# Patient Record
Sex: FEMALE | Race: WHITE | NOT HISPANIC OR LATINO | Employment: FULL TIME | URBAN - METROPOLITAN AREA
[De-identification: names, ages, dates, MRNs, and addresses within clinical notes are randomized per-mention and may not be internally consistent; named-entity substitution may affect disease eponyms.]

---
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
## 2022-03-24 LAB
C TRACH DNA SPEC QL NAA+PROBE: NORMAL
PAP RECOMMENDATION EXT: NORMAL
PAP SMEAR: NORMAL

## 2022-04-10 ENCOUNTER — HISTORICAL (OUTPATIENT)
Dept: ADMINISTRATIVE | Facility: HOSPITAL | Age: 22
End: 2022-04-10

## 2022-04-28 VITALS
BODY MASS INDEX: 31.83 KG/M2 | HEIGHT: 67 IN | WEIGHT: 202.81 LBS | OXYGEN SATURATION: 98 % | SYSTOLIC BLOOD PRESSURE: 101 MMHG | DIASTOLIC BLOOD PRESSURE: 71 MMHG

## 2022-09-17 ENCOUNTER — HISTORICAL (OUTPATIENT)
Dept: ADMINISTRATIVE | Facility: HOSPITAL | Age: 22
End: 2022-09-17

## 2023-03-16 ENCOUNTER — OFFICE VISIT (OUTPATIENT)
Dept: FAMILY MEDICINE | Facility: CLINIC | Age: 23
End: 2023-03-16
Payer: MEDICAID

## 2023-03-16 VITALS
TEMPERATURE: 99 F | BODY MASS INDEX: 31.83 KG/M2 | HEIGHT: 67 IN | OXYGEN SATURATION: 98 % | DIASTOLIC BLOOD PRESSURE: 80 MMHG | SYSTOLIC BLOOD PRESSURE: 114 MMHG | RESPIRATION RATE: 20 BRPM | HEART RATE: 80 BPM

## 2023-03-16 DIAGNOSIS — Z00.00 WELLNESS EXAMINATION: Primary | ICD-10-CM

## 2023-03-16 DIAGNOSIS — L30.9 ECZEMA, UNSPECIFIED TYPE: ICD-10-CM

## 2023-03-16 PROBLEM — E66.9 OBESITY: Status: ACTIVE | Noted: 2023-03-16

## 2023-03-16 PROCEDURE — 99214 OFFICE O/P EST MOD 30 MIN: CPT | Mod: PBBFAC | Performed by: FAMILY MEDICINE

## 2023-03-16 RX ORDER — NORETHINDRONE ACETATE AND ETHINYL ESTRADIOL 1; 20 MG/1; UG/1
1 TABLET ORAL DAILY
COMMUNITY
Start: 2023-03-01 | End: 2023-03-16 | Stop reason: SDUPTHER

## 2023-03-16 RX ORDER — NORETHINDRONE ACETATE AND ETHINYL ESTRADIOL 1; 20 MG/1; UG/1
1 TABLET ORAL DAILY
Qty: 90 TABLET | Refills: 3 | Status: SHIPPED | OUTPATIENT
Start: 2023-03-16 | End: 2024-03-28 | Stop reason: SDUPTHER

## 2023-03-16 RX ORDER — TRIAMCINOLONE ACETONIDE 1 MG/G
CREAM TOPICAL 2 TIMES DAILY
Qty: 45 G | Refills: 6 | Status: SHIPPED | OUTPATIENT
Start: 2023-03-16

## 2023-03-16 NOTE — PROGRESS NOTES
Subjective:       Patient ID: Beth Mccoy is a 23 y.o. female.    Chief Complaint: Follow-up (1 Year Annual Visit )    Follow-up  Pertinent negatives include no arthralgias, chest pain, headaches, joint swelling, neck pain, vomiting or weakness.     22 yo for follow up    No new concerns. Has been doing well. Working and in school.      Doing well on OCPs. No side effects. Menstrual cycles regular.     Eczema- uses steroid cream as needed.     Pap March 2022- NIL; chlamydia/gonorrhea negative  declines lab and tetanus vaccine today      Review of Systems   Constitutional:  Negative for activity change and unexpected weight change.   HENT:  Negative for hearing loss, rhinorrhea and trouble swallowing.    Eyes:  Negative for discharge and visual disturbance.   Respiratory:  Negative for chest tightness and wheezing.    Cardiovascular:  Negative for chest pain and palpitations.   Gastrointestinal:  Negative for blood in stool, constipation, diarrhea and vomiting.   Endocrine: Negative for polydipsia and polyuria.   Genitourinary:  Negative for difficulty urinating, dysuria, hematuria and menstrual problem.   Musculoskeletal:  Negative for arthralgias, joint swelling and neck pain.   Neurological:  Negative for weakness and headaches.   Psychiatric/Behavioral:  Negative for confusion and dysphoric mood.             Objective:      Vitals:    03/16/23 0853   BP: 114/80   Pulse: 80   Resp: 20   Temp: 98.7 °F (37.1 °C)       Physical Exam      Assessment/Plan:  Wellness examination  - OCP refilled  - healthy diet/exercise  - recommended daily sunscreen    Eczema, unspecified type  - moisturizer daily and topical steroid prn  - gentle skin care discussed    Other orders  -     JUNEL 1/20, 21, 1-20 mg-mcg per tablet; Take 1 tablet by mouth once daily.  Dispense: 90 tablet; Refill: 3  -     triamcinolone acetonide 0.1% (KENALOG) 0.1 % cream; Apply topically 2 (two) times daily.  Dispense: 45 g; Refill: 6    Declines any  labs and vaccines today     Follow up in about 1 year (around 3/16/2024).

## 2023-03-29 ENCOUNTER — DOCUMENTATION ONLY (OUTPATIENT)
Dept: FAMILY MEDICINE | Facility: CLINIC | Age: 23
End: 2023-03-29
Payer: MEDICAID

## 2023-07-21 ENCOUNTER — OFFICE VISIT (OUTPATIENT)
Dept: FAMILY MEDICINE | Facility: CLINIC | Age: 23
End: 2023-07-21
Payer: MEDICAID

## 2023-07-21 ENCOUNTER — HOSPITAL ENCOUNTER (OUTPATIENT)
Dept: RADIOLOGY | Facility: HOSPITAL | Age: 23
Discharge: HOME | End: 2023-07-21
Attending: FAMILY MEDICINE
Payer: MEDICAID

## 2023-07-21 VITALS
OXYGEN SATURATION: 99 % | RESPIRATION RATE: 18 BRPM | SYSTOLIC BLOOD PRESSURE: 115 MMHG | WEIGHT: 244 LBS | TEMPERATURE: 98 F | HEIGHT: 67 IN | DIASTOLIC BLOOD PRESSURE: 79 MMHG | BODY MASS INDEX: 38.3 KG/M2 | HEART RATE: 80 BPM

## 2023-07-21 DIAGNOSIS — M25.571 ACUTE RIGHT ANKLE PAIN: Primary | ICD-10-CM

## 2023-07-21 DIAGNOSIS — M25.571 ACUTE RIGHT ANKLE PAIN: ICD-10-CM

## 2023-07-21 PROCEDURE — 73610 X-RAY EXAM OF ANKLE: CPT | Mod: TC,RT

## 2023-07-21 PROCEDURE — 99214 OFFICE O/P EST MOD 30 MIN: CPT | Mod: PBBFAC | Performed by: FAMILY MEDICINE

## 2023-07-21 NOTE — PROGRESS NOTES
Subjective:       Patient ID: Beth Mccoy is a 23 y.o. female.    Chief Complaint: Ankle Pain (Asking for Ortho referral)    HPI    24 yo with right ankle pain. She hit her ankle on a boat propellor about 1 month ago. + bruised immediately. Still painful. Walking after sitting for longer period of time or when getting up in the AM causes increased pain. Located on medial aspect of ankle. + swelling at times. Has not done ice/compression/rest. No pain with ROM in ankle- only pain with walking. No imaging.     Review of Systems  As per HPI         Objective:      Vitals:    07/21/23 1051   BP: 115/79   Pulse: 80   Resp: 18   Temp: 98.4 °F (36.9 °C)       Physical Exam    Gen: alert, no acute distress  MSK: right ankle- minimal swelling on medial aspect. No bruising. Full ROM. Mild tenderness on medial aspect. Normal gait.   Psych: cooperative, appropriate mood and affect    Assessment/Plan:  Acute right ankle pain  -     X-Ray Ankle Complete Right; Future; Expected date: 07/21/2023    - xray, ice, compression, NSAID,   - return if symptoms continue     Follow up for keep previously scheduled follow up.

## 2023-07-21 NOTE — LETTER
July 21, 2023    Beth Mccoy  [address]  [facility] [address]             [facility]  Family Medicine  [facility] [address]  Phone: [phone]   July 21, 2023     Patient: Beth Mccoy   YOB: 2000   Date of Visit: 7/21/2023       To Whom it May Concern:    Beth Mccoy was seen in my clinic on 7/21/2023. She may return to work on 7/21/2023 .    Please excuse her from any classes or work missed.    If you have any questions or concerns, please don't hesitate to call.    Sincerely,         Laura Law MD

## 2024-03-28 ENCOUNTER — OFFICE VISIT (OUTPATIENT)
Dept: FAMILY MEDICINE | Facility: CLINIC | Age: 24
End: 2024-03-28
Payer: COMMERCIAL

## 2024-03-28 VITALS
WEIGHT: 247.13 LBS | TEMPERATURE: 98 F | RESPIRATION RATE: 18 BRPM | OXYGEN SATURATION: 99 % | BODY MASS INDEX: 38.79 KG/M2 | DIASTOLIC BLOOD PRESSURE: 83 MMHG | HEART RATE: 86 BPM | SYSTOLIC BLOOD PRESSURE: 128 MMHG | HEIGHT: 67 IN

## 2024-03-28 DIAGNOSIS — Z00.00 WELLNESS EXAMINATION: Primary | ICD-10-CM

## 2024-03-28 DIAGNOSIS — B36.0 TINEA VERSICOLOR: ICD-10-CM

## 2024-03-28 PROCEDURE — 99213 OFFICE O/P EST LOW 20 MIN: CPT | Mod: PBBFAC | Performed by: FAMILY MEDICINE

## 2024-03-28 RX ORDER — NORETHINDRONE ACETATE AND ETHINYL ESTRADIOL 1; 20 MG/1; UG/1
1 TABLET ORAL DAILY
Qty: 90 TABLET | Refills: 3 | Status: SHIPPED | OUTPATIENT
Start: 2024-03-28

## 2024-03-28 NOTE — PROGRESS NOTES
Subjective:       Patient ID: Beth Mccoy is a 24 y.o. female.    Chief Complaint: Annual Exam    HPI  23 yo for annual exam. Patient has been doing well. No recent illnesses    Has noticed a few lesions on her torso recently. Not itchy. Not located anywhere else.     Doing well on OCP. Menstrual cycles are regular.     Has graduated college and likes new job.    Pap March 2022 NIL    Review of Systems  As per HPI         Objective:      Vitals:    03/28/24 0800   BP: 128/83   Pulse: 86   Resp: 18   Temp: 98.1 °F (36.7 °C)       Physical Exam    General: pleasant, well developed, in no acute distress  Head: normocephalic, atraumatic  Skin: warm and dry; mildly hyperpigmented macules on torso  Heart: regular rate and rhythm, S1S2 normal, no murmurs, rubs, or gallops  Lungs: clear to auscultation bilaterally, no wheezes  Abdomen: bowel sounds present, soft, nontender  Extremities: no edema  Neurological: nonfocal, alert and oriented, cooperative with exam  Psych: judgement and insight good, though process logical, goal directed      Assessment/Plan:  Wellness examination  - doing well  - healthy lifestyle with diet/exercise    Tinea versicolor  - selsun blue as topical wash    Other orders  -     JUNEL 1/20, 21, 1-20 mg-mcg per tablet; Take 1 tablet by mouth once daily.  Dispense: 90 tablet; Refill: 3    Pap at next appointment       Follow up in about 1 year (around 3/28/2025).

## 2024-04-19 ENCOUNTER — OFFICE VISIT (OUTPATIENT)
Dept: FAMILY MEDICINE | Facility: CLINIC | Age: 24
End: 2024-04-19
Payer: COMMERCIAL

## 2024-04-19 VITALS
HEIGHT: 67 IN | TEMPERATURE: 98 F | SYSTOLIC BLOOD PRESSURE: 122 MMHG | OXYGEN SATURATION: 98 % | BODY MASS INDEX: 38.69 KG/M2 | WEIGHT: 246.5 LBS | RESPIRATION RATE: 18 BRPM | DIASTOLIC BLOOD PRESSURE: 81 MMHG | HEART RATE: 92 BPM

## 2024-04-19 DIAGNOSIS — Z30.9 ENCOUNTER FOR CONTRACEPTIVE MANAGEMENT, UNSPECIFIED TYPE: Primary | ICD-10-CM

## 2024-04-19 DIAGNOSIS — E66.9 OBESITY (BMI 35.0-39.9 WITHOUT COMORBIDITY): ICD-10-CM

## 2024-04-19 PROCEDURE — 99214 OFFICE O/P EST MOD 30 MIN: CPT | Mod: PBBFAC | Performed by: FAMILY MEDICINE

## 2024-04-19 NOTE — PROGRESS NOTES
Subjective:       Patient ID: Beth Mccoy is a 24 y.o. female.    Chief Complaint: Follow-up and Contraception (Wants to discuss options)    HPI  25 yo to discuss contraception options.   She has been on OCP x 6 years. She previously had intense cramping, heavy, long menstrual cycles. Since being on OCP, cycles are much better controlled. Pt notes that she has been on OCP x 6 years and has gained about 100 pounds over this time frame. Pt also reports decreased libido more recently and is worried that her OCPs are the cause of the weight gain and decreased libido.   Pt also interested in discussing options for weight loss medications. She has never taken any in the past. She reports snacking. Tries to exercise at times but not regularly.     Review of Systems  As per HPI         Objective:      Vitals:    04/19/24 0833   BP: 122/81   Pulse: 92   Resp: 18   Temp: 98.1 °F (36.7 °C)       Physical Exam    General: pleasant, well developed, in no acute distress  Head: normocephalic, atraumatic  Skin: warm and dry  Neurological: nonfocal, alert and oriented, cooperative with exam  Psych: judgement and insight good, though process logical, goal directed      Assessment/Plan:  Encounter for contraceptive management, unspecified type  - discussed various options for contraception with risks/benefits/alternatives  - patient given handout to continue to consider her options    Obesity (BMI 35.0-39.9 without comorbidity)  - diet/exercise/medications to assist with weight discussed  - risks/side effects/benefits of medications discussed  - patient handout given to continue to consider options       Follow up for keep previously scheduled appointment.

## 2024-12-12 ENCOUNTER — OFFICE VISIT (OUTPATIENT)
Dept: FAMILY MEDICINE | Facility: CLINIC | Age: 24
End: 2024-12-12
Payer: COMMERCIAL

## 2024-12-12 VITALS
WEIGHT: 255 LBS | RESPIRATION RATE: 20 BRPM | SYSTOLIC BLOOD PRESSURE: 128 MMHG | HEART RATE: 104 BPM | BODY MASS INDEX: 40.02 KG/M2 | HEIGHT: 67 IN | DIASTOLIC BLOOD PRESSURE: 77 MMHG | TEMPERATURE: 99 F | OXYGEN SATURATION: 99 %

## 2024-12-12 DIAGNOSIS — Z34.90 PREGNANCY, UNSPECIFIED GESTATIONAL AGE: Primary | ICD-10-CM

## 2024-12-12 LAB
B-HCG UR QL: POSITIVE
CTP QC/QA: YES

## 2024-12-12 PROCEDURE — 81025 URINE PREGNANCY TEST: CPT | Mod: PBBFAC | Performed by: FAMILY MEDICINE

## 2024-12-12 PROCEDURE — 99213 OFFICE O/P EST LOW 20 MIN: CPT | Mod: PBBFAC | Performed by: FAMILY MEDICINE

## 2024-12-12 RX ORDER — METHOCARBAMOL 500 MG/1
500 TABLET, FILM COATED ORAL 3 TIMES DAILY
COMMUNITY
Start: 2024-12-04 | End: 2024-12-12

## 2024-12-12 RX ORDER — DICLOFENAC SODIUM 75 MG/1
75 TABLET, DELAYED RELEASE ORAL 2 TIMES DAILY
COMMUNITY
Start: 2024-12-05 | End: 2024-12-12

## 2024-12-12 NOTE — PROGRESS NOTES
Subjective:       Patient ID: Beth Mccoy is a 24 y.o. female.    Chief Complaint: postive home pregnancy test    HPI  25 yo presents to clinic with a positive home UPT from today.  Patient's LMP was 11-12-24.  Patient stopped OCPs earlier this year.  Reports that menstrual cycles have been regular.  She has not been on prenatal vitamins.  This is the patient's 1st pregnancy.    She was recently seen in an urgent care for hip pain and was given an injection, NSAIDs, and muscle relaxer.  She is otherwise not on any medications at this time.    Patient denies any tobacco, alcohol, or drug use    Review of Systems  As per HPI         Objective:      Vitals:    12/12/24 0958   BP: 128/77   Pulse: 104   Resp: 20   Temp: 98.5 °F (36.9 °C)       Physical Exam    General: pleasant, well developed, in no acute distress  Head: normocephalic, atraumatic  Skin: warm and dry  Neurological: nonfocal, alert and oriented, cooperative with exam  Psych: judgement and insight good, though process logical, goal directed      Assessment/Plan:  Pregnancy, unspecified gestational age  -     POCT Urine Pregnancy    Other orders  -     PNV,calcium 72-iron-folic acid (PRENATAL VITAMIN PLUS LOW IRON) 27 mg iron- 1 mg Tab; Take 1 tablet (1 each total) by mouth once daily.  Dispense: 30 tablet; Refill: 11    UPT in clinic today positive  Start prenatal vitamins.  Safe medication in pregnancy list given to patient  Stop NSAIDs and muscle relaxers.  Discussed Tylenol use as needed for pain.  Discussed OB follow-up.  Patient will think about which OB clinic she would like to see and she will call to schedule an appointment.  Encouraged patient to call our office if she needs any assistance with establishing care and scheduling an appointment.  Avoid tobacco, alcohol, or drug use.     Follow up for keep previously scheduled appt.

## 2025-01-09 ENCOUNTER — NURSE TRIAGE (OUTPATIENT)
Dept: ADMINISTRATIVE | Facility: CLINIC | Age: 25
End: 2025-01-09
Payer: COMMERCIAL

## 2025-01-10 NOTE — TELEPHONE ENCOUNTER
Pt calling with saying that she is pregnant 8 weeks and that she started with a cough and fever that started this afternoon. Pt triaged and care advice to see MD within 24 hours. Pt will call back if any other questions or concerns or if SOB fever above 104 and if last over 3 days. I went over some thing to do at home and meds that are ok to take during pregnancy. I will route to provider she will be seeing on wed next week. I didn't see name of a provider. I couldn't route message                    Reason for Disposition   SEVERE coughing spells (e.g., whooping sound after coughing, vomiting after coughing)   Fever 100.4 F (38.0 C) or higher    Additional Information   Negative: SEVERE difficulty breathing (e.g., struggling for each breath, speaks in single words)   Negative: Bluish (or gray) lips or face now   Negative: [1] Rapid onset of cough AND [2] has hives   Negative: Coughing started suddenly after medicine, an allergic food or bee sting   Negative: [1] Difficulty breathing AND [2] exposure to flames, smoke, or fumes   Negative: [1] Stridor AND [2] difficulty breathing   Negative: Sounds like a life-threatening emergency to the triager   Negative: [1] MODERATE difficulty breathing (e.g., speaks in phrases, SOB even at rest, pulse 100-120) AND [2] still present when not coughing   Negative: Chest pain  (Exception: MILD central chest pain, present only when coughing.)   Negative: Patient sounds very sick or weak to the triager   Negative: [1] MILD difficulty breathing (e.g., minimal/no SOB at rest, SOB with walking, pulse <100) AND [2] still present when not coughing   Negative: [1] Coughed up blood AND [2] > 1 tablespoon (15 ml)   (Exception: Blood-tinged sputum.)   Negative: Fever > 103 F (39.4 C)   Negative: [1] Fever > 101 F (38.3 C) AND [2] age > 60 years   Negative: [1] Fever > 100 F (37.8 C) AND [2] bedridden (e.g., CVA, chronic illness, recovering from surgery)   Negative: [1] Fever > 100 F (37.8 C)  AND [2] diabetes mellitus or weak immune system (e.g., HIV positive, cancer chemo, splenectomy, organ transplant, chronic steroids)   Negative: Wheezing is present   Negative: [1] Ankle swelling AND [2] swelling is increasing   Negative: Shock suspected (e.g., cold/pale/clammy skin, too weak to stand, low BP, rapid pulse)   Negative: Difficult to awaken or acting confused (e.g., disoriented, slurred speech)   Negative: Rash with purple (or blood-colored) spots or dots   Negative: Sounds like a life-threatening emergency to the triager   Negative: [1] Headache AND [2] stiff neck (can't touch chin to chest)   Negative: Difficulty breathing   Negative: IV Drug Use (IVDU)   Negative: Fever > 104 F (40 C)   Negative: [1] Fever > 100 F (37.8 C) AND [2] indwelling urinary catheter (e.g., Russo)   Negative: [1] Fever > 100 F (37.8 C) AND [2] has port (portacath), central line, or PICC line   Negative: [1] Fever > 100 F (37.8 C) AND [2] diabetes mellitus or weak immune system (e.g., HIV positive, cancer chemo, splenectomy, organ transplant, chronic steroids)   Negative: Pain or burning with passing urine (urination) is main symptom, or increased frequency of urination   Negative: Severe chills (i.e., feeling extremely cold WITH shaking chills)   Negative: [1] Drinking very little AND [2] dehydration suspected (e.g., no urine > 12 hours, very dry mouth, very lightheaded)   Negative: Patient sounds very sick or weak to the triager   Negative: Having contractions or other symptoms of labor   Negative: Leakage of fluid from vagina   Negative: [1] Pregnant 23 or more weeks AND [2] baby is moving less today (e.g., kick count < 5 in 1 hour or < 10 in 2 hours)   Negative: [1] Fever > 100 F (37.8 C) AND [2] foreign travel to a developing country in the last month   Negative: [1] Fever 100.4 F (38.0 C) or higher AND [2] NO cold symptoms (e.g., runny nose, cough)   Negative: [1] Fever 100.4 F (38.0 C) or higher AND [2] lasts > 3  days    Protocols used: Cough - Acute Non-Productive-A-AH, Pregnancy - Fever-A-AH

## 2025-02-12 LAB
HIV 1+2 AB+HIV1 P24 AG SERPL QL IA: NEGATIVE
RPR: NONREACTIVE
RUBELLA IMMUNE STATUS: NORMAL

## 2025-04-03 ENCOUNTER — OFFICE VISIT (OUTPATIENT)
Dept: FAMILY MEDICINE | Facility: CLINIC | Age: 25
End: 2025-04-03
Payer: COMMERCIAL

## 2025-04-03 VITALS
WEIGHT: 245.38 LBS | BODY MASS INDEX: 38.51 KG/M2 | TEMPERATURE: 98 F | HEART RATE: 89 BPM | OXYGEN SATURATION: 98 % | DIASTOLIC BLOOD PRESSURE: 77 MMHG | HEIGHT: 67 IN | SYSTOLIC BLOOD PRESSURE: 114 MMHG

## 2025-04-03 DIAGNOSIS — Z34.90 PREGNANCY, UNSPECIFIED GESTATIONAL AGE: ICD-10-CM

## 2025-04-03 DIAGNOSIS — Z00.00 WELLNESS EXAMINATION: Primary | ICD-10-CM

## 2025-04-03 PROCEDURE — 99213 OFFICE O/P EST LOW 20 MIN: CPT | Mod: PBBFAC | Performed by: FAMILY MEDICINE

## 2025-04-03 NOTE — PROGRESS NOTES
Subjective:       Patient ID: Beth Mccoy is a 25 y.o. female.    Chief Complaint: Follow-up (Yearly visit, wellness check)    HPI  24 yo here for routine wellness    Currently pregnant- seeing Dr Rodriguez at [facility]. About 20 weeks pregnant. Taking PNV. Reports pregnancy has been going well. Feeling well. No complications in pregnancy reported by the patient so far. Female baby (Peg Rosenberg).  Had pap and labs this year with OB at the beginning of her pregnancy. No complaints or concerns at this time    Review of Systems  As per HPI         Objective:      Vitals:    04/03/25 0755   BP: 114/77   Pulse: 89   Temp: 98.3 °F (36.8 °C)       Physical Exam   General: pleasant, well developed, in no acute distress  Head: normocephalic, atraumatic  Skin: warm and dry  Heart: regular rate and rhythm, S1S2 normal, no murmurs, rubs, or gallops  Lungs: clear to auscultation bilaterally, no wheezes  Extremities: no edema  Neurological: nonfocal, alert and oriented, cooperative with exam  Psych: judgement and insight good, though process logical, goal directed       Assessment/Plan:  Wellness examination  - healthy lifestyle, exercise, healthy diet discussed  - pt will upload pap completed this year    Pregnancy, unspecified gestational age  - continue PNV and OB care       Follow up in about 1 year (around 4/3/2026).

## 2025-05-28 LAB — STREP B PCR (OHS): NEGATIVE

## 2025-06-09 ENCOUNTER — HOSPITAL ENCOUNTER (OUTPATIENT)
Facility: HOSPITAL | Age: 25
LOS: 1 days | Discharge: HOME | End: 2025-06-10
Attending: OBSTETRICS & GYNECOLOGY | Admitting: OBSTETRICS & GYNECOLOGY
Payer: COMMERCIAL

## 2025-06-09 DIAGNOSIS — O41.00X0 OLIGOHYDRAMNIOS, ANTEPARTUM, SINGLE OR UNSPECIFIED FETUS: Primary | ICD-10-CM

## 2025-06-09 DIAGNOSIS — Z34.90 PREGNANCY: ICD-10-CM

## 2025-06-09 DIAGNOSIS — O41.00X0 OLIGOHYDRAMNIOS: ICD-10-CM

## 2025-06-09 LAB
ABORH RETYPE: NORMAL
BASOPHILS # BLD AUTO: 0.04 X10(3)/MCL
BASOPHILS NFR BLD AUTO: 0.4 %
EOSINOPHIL # BLD AUTO: 0.06 X10(3)/MCL (ref 0–0.9)
EOSINOPHIL NFR BLD AUTO: 0.6 %
ERYTHROCYTE [DISTWIDTH] IN BLOOD BY AUTOMATED COUNT: 13.3 % (ref 11.5–17)
GROUP & RH: NORMAL
HCT VFR BLD AUTO: 38.8 % (ref 37–47)
HGB BLD-MCNC: 12.3 G/DL (ref 12–16)
IMM GRANULOCYTES # BLD AUTO: 0.06 X10(3)/MCL (ref 0–0.04)
IMM GRANULOCYTES NFR BLD AUTO: 0.6 %
INDIRECT COOMBS: NORMAL
LYMPHOCYTES # BLD AUTO: 2.28 X10(3)/MCL (ref 0.6–4.6)
LYMPHOCYTES NFR BLD AUTO: 22.8 %
MCH RBC QN AUTO: 29.7 PG (ref 27–31)
MCHC RBC AUTO-ENTMCNC: 31.7 G/DL (ref 33–36)
MCV RBC AUTO: 93.7 FL (ref 80–94)
MONOCYTES # BLD AUTO: 0.45 X10(3)/MCL (ref 0.1–1.3)
MONOCYTES NFR BLD AUTO: 4.5 %
NEUTROPHILS # BLD AUTO: 7.09 X10(3)/MCL (ref 2.1–9.2)
NEUTROPHILS NFR BLD AUTO: 71.1 %
NRBC BLD AUTO-RTO: 0 %
PLATELET # BLD AUTO: 294 X10(3)/MCL (ref 130–400)
PMV BLD AUTO: 12 FL (ref 7.4–10.4)
RBC # BLD AUTO: 4.14 X10(6)/MCL (ref 4.2–5.4)
SPECIMEN OUTDATE: NORMAL
WBC # BLD AUTO: 9.98 X10(3)/MCL (ref 4.5–11.5)

## 2025-06-09 PROCEDURE — 85025 COMPLETE CBC W/AUTO DIFF WBC: CPT | Performed by: OBSTETRICS & GYNECOLOGY

## 2025-06-09 PROCEDURE — G0378 HOSPITAL OBSERVATION PER HR: HCPCS

## 2025-06-09 PROCEDURE — 63600175 PHARM REV CODE 636 W HCPCS: Performed by: OBSTETRICS & GYNECOLOGY

## 2025-06-09 PROCEDURE — 36415 COLL VENOUS BLD VENIPUNCTURE: CPT | Performed by: OBSTETRICS & GYNECOLOGY

## 2025-06-09 PROCEDURE — 86901 BLOOD TYPING SEROLOGIC RH(D): CPT | Performed by: OBSTETRICS & GYNECOLOGY

## 2025-06-09 PROCEDURE — G0379 DIRECT REFER HOSPITAL OBSERV: HCPCS

## 2025-06-09 RX ORDER — SODIUM CHLORIDE, SODIUM LACTATE, POTASSIUM CHLORIDE, CALCIUM CHLORIDE 600; 310; 30; 20 MG/100ML; MG/100ML; MG/100ML; MG/100ML
INJECTION, SOLUTION INTRAVENOUS CONTINUOUS
Status: DISCONTINUED | OUTPATIENT
Start: 2025-06-09 | End: 2025-06-10 | Stop reason: HOSPADM

## 2025-06-09 RX ADMIN — SODIUM CHLORIDE, POTASSIUM CHLORIDE, SODIUM LACTATE AND CALCIUM CHLORIDE: 600; 310; 30; 20 INJECTION, SOLUTION INTRAVENOUS at 03:06

## 2025-06-10 VITALS
RESPIRATION RATE: 18 BRPM | SYSTOLIC BLOOD PRESSURE: 124 MMHG | OXYGEN SATURATION: 98 % | BODY MASS INDEX: 38.77 KG/M2 | DIASTOLIC BLOOD PRESSURE: 72 MMHG | WEIGHT: 247 LBS | HEIGHT: 67 IN | TEMPERATURE: 98 F | HEART RATE: 85 BPM

## 2025-06-10 PROBLEM — O26.643 INTRAHEPATIC CHOLESTASIS OF PREGNANCY IN THIRD TRIMESTER: Status: ACTIVE | Noted: 2025-06-10

## 2025-06-10 PROBLEM — O41.00X0 OLIGOHYDRAMNIOS ANTEPARTUM: Status: ACTIVE | Noted: 2025-06-10

## 2025-06-10 LAB
ALBUMIN SERPL-MCNC: 2.5 G/DL (ref 3.5–5)
ALBUMIN/GLOB SERPL: 0.7 RATIO (ref 1.1–2)
ALP SERPL-CCNC: 184 UNIT/L (ref 40–150)
ALT SERPL-CCNC: 172 UNIT/L (ref 0–55)
ANION GAP SERPL CALC-SCNC: 10 MEQ/L
AST SERPL-CCNC: 71 UNIT/L (ref 11–45)
BILIRUB SERPL-MCNC: 1.1 MG/DL
BUN SERPL-MCNC: 4.7 MG/DL (ref 7–18.7)
CALCIUM SERPL-MCNC: 8.6 MG/DL (ref 8.4–10.2)
CHLORIDE SERPL-SCNC: 108 MMOL/L (ref 98–107)
CO2 SERPL-SCNC: 22 MMOL/L (ref 22–29)
CREAT SERPL-MCNC: 0.57 MG/DL (ref 0.55–1.02)
CREAT/UREA NIT SERPL: 8
CTP QC/QA: YES
GFR SERPLBLD CREATININE-BSD FMLA CKD-EPI: >60 ML/MIN/1.73/M2
GLOBULIN SER-MCNC: 3.6 GM/DL (ref 2.4–3.5)
GLUCOSE SERPL-MCNC: 88 MG/DL (ref 74–100)
HAV IGM SERPL QL IA: NONREACTIVE
HBV CORE IGM SERPL QL IA: NONREACTIVE
HBV SURFACE AG SERPL QL IA: NONREACTIVE
HCV AB SERPL QL IA: NONREACTIVE
POTASSIUM SERPL-SCNC: 3.5 MMOL/L (ref 3.5–5.1)
PROT SERPL-MCNC: 6.1 GM/DL (ref 6.4–8.3)
RUPTURE OF MEMBRANE: NEGATIVE
SODIUM SERPL-SCNC: 140 MMOL/L (ref 136–145)

## 2025-06-10 PROCEDURE — 63600175 PHARM REV CODE 636 W HCPCS: Performed by: OBSTETRICS & GYNECOLOGY

## 2025-06-10 PROCEDURE — 80053 COMPREHEN METABOLIC PANEL: CPT | Performed by: OBSTETRICS & GYNECOLOGY

## 2025-06-10 PROCEDURE — 82542 COL CHROMOTOGRAPHY QUAL/QUAN: CPT | Performed by: OBSTETRICS & GYNECOLOGY

## 2025-06-10 PROCEDURE — G0378 HOSPITAL OBSERVATION PER HR: HCPCS

## 2025-06-10 PROCEDURE — 80074 ACUTE HEPATITIS PANEL: CPT | Performed by: OBSTETRICS & GYNECOLOGY

## 2025-06-10 PROCEDURE — 36415 COLL VENOUS BLD VENIPUNCTURE: CPT | Performed by: OBSTETRICS & GYNECOLOGY

## 2025-06-10 RX ORDER — URSODIOL 250 MG/1
500 TABLET, FILM COATED ORAL
Qty: 180 TABLET | Refills: 3 | Status: SHIPPED | OUTPATIENT
Start: 2025-06-10 | End: 2025-10-08

## 2025-06-10 RX ORDER — URSODIOL 250 MG/1
500 TABLET, FILM COATED ORAL
Status: DISCONTINUED | OUTPATIENT
Start: 2025-06-10 | End: 2025-06-10 | Stop reason: HOSPADM

## 2025-06-10 RX ORDER — URSODIOL 250 MG/1
250 TABLET, FILM COATED ORAL 2 TIMES DAILY WITH MEALS
Status: DISCONTINUED | OUTPATIENT
Start: 2025-06-10 | End: 2025-06-10

## 2025-06-10 RX ORDER — SODIUM CHLORIDE, SODIUM LACTATE, POTASSIUM CHLORIDE, CALCIUM CHLORIDE 600; 310; 30; 20 MG/100ML; MG/100ML; MG/100ML; MG/100ML
INJECTION, SOLUTION INTRAVENOUS CONTINUOUS
Status: DISCONTINUED | OUTPATIENT
Start: 2025-06-10 | End: 2025-06-10

## 2025-06-10 RX ADMIN — SODIUM CHLORIDE, POTASSIUM CHLORIDE, SODIUM LACTATE AND CALCIUM CHLORIDE: 600; 310; 30; 20 INJECTION, SOLUTION INTRAVENOUS at 11:06

## 2025-06-10 RX ADMIN — SODIUM CHLORIDE, POTASSIUM CHLORIDE, SODIUM LACTATE AND CALCIUM CHLORIDE 1000 ML: 600; 310; 30; 20 INJECTION, SOLUTION INTRAVENOUS at 10:06

## 2025-06-10 NOTE — DISCHARGE SUMMARY
Delivery Discharge Summary  Obstetrics      Primary OB Clinician: MD Jennifer    Discharge Provider: Jevon Rodriguez MD    Admission date: 2025  Discharge date: 06/10/2025    Admit Dx:   Discharge Dx:  Problem List[1]  Oligohydramnios  ICP  Transaminitis    Hospital Course:  Beth Mccoy is a 25 y.o.  at 30+0 weeks gestation who was admitted on 2025 for observation in the setting of new oligohydramnios. US on  in clinic showed SHEFALI 4.37 cm without 2x2 pocket. She received IVF overnight, however repeat US showed SHEFALI 3. ROM+ was negative. She received more aggressive IVF and SHEFALI was still low at 4, however there was a 2x2 pocket present. She complained of itching of palms and soles and LFTs were noted to be elevated. She was started on Actigall, bile acids pending at discharge. She was evaluated by MFM and deemed safe for discharge with close follow up.     Pertinent studies:  Postpartum CBC  Lab Results   Component Value Date    WBC 9.98 2025    HGB 12.3 2025    HCT 38.8 2025    MCV 93.7 2025     2025       This patient has no babies on file.    Disposition: To home, self care    Follow Up: Later this week with MFM. Twice weekly testing      Current Discharge Medication List        START taking these medications    Details   ursodioL (ACTIGALL) 250 mg Tab Take 2 tablets (500 mg total) by mouth 3 (three) times daily with meals.  Qty: 180 tablet, Refills: 3           CONTINUE these medications which have NOT CHANGED    Details   PNV,calcium 72-iron-folic acid (PRENATAL VITAMIN PLUS LOW IRON) 27 mg iron- 1 mg Tab Take 1 tablet (1 each total) by mouth once daily.  Qty: 30 tablet, Refills: 11      triamcinolone acetonide 0.1% (KENALOG) 0.1 % cream Apply topically 2 (two) times daily.  Qty: 45 g, Refills: 6             Jevon Rodriguez        [1]   Patient Active Problem List  Diagnosis    Obesity    Eczema    Oligohydramnios antepartum    Intrahepatic cholestasis  of pregnancy in third trimester

## 2025-06-10 NOTE — PROGRESS NOTES
Brief Progress Note    BPP 6/8, off for fluid. SHEFALI 3. Speculum exam without complete visualization of the cervix though no pooling. ROM+ negative.    Will increase fluid resuscitation and consult MFM. Repeat US tomorrow with growth.    Patient also complaining of itching of palms and soles for the past week without rash. Will get CMP and bile acids.

## 2025-06-10 NOTE — H&P
"   HISTORY AND PHYSICAL                                                OBSTETRICS          Subjective:      Beth Mccoy is a 25 y.o.  female with IUP at 30w0d weeks gestation who was admitted to antepartum for observation in the setting of new oligohydramnios. Patient was seen in clinic yesterday for routine visit and complained of decreased fetal movement. She was found to have SHEFALI 4.37 cm without 2x2 cm pocket.     She states that over the weekend she was at an outdoor wedding and had some fluid between her legs but believes it was sweat, denies large gush or continuous leak. Denies contractions, bleeding, decreased fetal movement.     Pertinent medical history for this pregnancy includes BMI 38.  Care this pregnancy has been with Dr. Rodriguez    PMHx: History reviewed. No pertinent past medical history.    PSHx:   Past Surgical History:   Procedure Laterality Date    TONSILLECTOMY AND ADENOIDECTOMY         All: Review of patient's allergies indicates:  No Known Allergies    Meds: Prescriptions Prior to Admission[1]    SH: Social History[2]    FH:   Family History   Problem Relation Name Age of Onset    Diabetes Mother      Diabetes Father         OBHx:   OB History    Para Term  AB Living   1 0 0 0 0 0   SAB IAB Ectopic Multiple Live Births   0 0 0 0 0      # Outcome Date GA Lbr Herb/2nd Weight Sex Type Anes PTL Lv   1 Current                Objective:      /80   Pulse 73   Temp 98 °F (36.7 °C) (Oral)   Resp 18   Ht 5' 7" (1.702 m)   Wt 112 kg (247 lb)   LMP 2024 (Exact Date)   SpO2 98%   Breastfeeding No   BMI 38.69 kg/m²   Body mass index is 38.69 kg/m².    General:   alert and cooperative   HEENT:  normocephalic, atraumatic   Lungs:   clear to auscultation bilaterally   Heart:   regular rate and rhythm, S1, S2 normal   Abdomen:  gravid, non-tender   Extremities non-tender, no edema   Derm: no rashes or lesions   Psych: appropriate mood and affect   Pelvis:  " adequate       FHT: Category: 140, mod variability, ++accels, occasional variable decel                 TOCO: Contractions: none                                        Lab Review  GBS: unknown     Assessment:     25 y.o.  at 30w0d weeks gestation.  Oligohydramnios    There are no hospital problems to display for this patient.         Plan:     Continuous monitoring, has been reassuring  Continue  cc/hr  Plan for repeat BPP with SHEFALI this AM, if reassuring, will discharge with close follow up. If fluid is low, will perform PPROM exam           [1]   Medications Prior to Admission   Medication Sig Dispense Refill Last Dose/Taking    PNV,calcium 72-iron-folic acid (PRENATAL VITAMIN PLUS LOW IRON) 27 mg iron- 1 mg Tab Take 1 tablet (1 each total) by mouth once daily. 30 tablet 11 2025 Morning    triamcinolone acetonide 0.1% (KENALOG) 0.1 % cream Apply topically 2 (two) times daily. (Patient taking differently: Apply topically as needed.) 45 g 6    [2]   Social History  Socioeconomic History    Marital status: Single   Tobacco Use    Smoking status: Never    Smokeless tobacco: Never   Vaping Use    Vaping status: Never Used   Substance and Sexual Activity    Alcohol use: Not Currently     Comment: ocationally    Drug use: Never    Sexual activity: Yes     Partners: Male     Birth control/protection: OCP     Social Drivers of Health     Financial Resource Strain: Low Risk  (2025)    Overall Financial Resource Strain (CARDIA)     Difficulty of Paying Living Expenses: Not hard at all   Food Insecurity: No Food Insecurity (2025)    Hunger Vital Sign     Worried About Running Out of Food in the Last Year: Never true     Ran Out of Food in the Last Year: Never true   Transportation Needs: No Transportation Needs (2025)    PRAPARE - Transportation     Lack of Transportation (Medical): No     Lack of Transportation (Non-Medical): No   Physical Activity: Insufficiently Active (2024)    Exercise  Vital Sign     Days of Exercise per Week: 5 days     Minutes of Exercise per Session: 20 min   Stress: No Stress Concern Present (6/9/2025)    [employer] [employer] - Occupational Stress Questionnaire     Feeling of Stress : Not at all   Housing Stability: Low Risk  (6/9/2025)    Housing Stability Vital Sign     Unable to Pay for Housing in the Last Year: No     Homeless in the Last Year: No

## 2025-06-10 NOTE — PLAN OF CARE
Problem: Adult Inpatient Plan of Care  Goal: Plan of Care Review  6/10/2025 0511 by Malia Loco RN  Outcome: Progressing  6/10/2025 0511 by Malia Loco RN  Outcome: Progressing  Goal: Patient-Specific Goal (Individualized)  6/10/2025 0511 by Malia Loco RN  Outcome: Progressing  6/10/2025 0511 by Malia Loco RN  Outcome: Progressing  Goal: Absence of Hospital-Acquired Illness or Injury  6/10/2025 0511 by Malia Loco RN  Outcome: Progressing  6/10/2025 0511 by Malia Loco RN  Outcome: Progressing  Goal: Optimal Comfort and Wellbeing  6/10/2025 0511 by Malia Loco RN  Outcome: Progressing  6/10/2025 0511 by Malia Loco RN  Outcome: Progressing  Goal: Readiness for Transition of Care  6/10/2025 0511 by Malia Loco RN  Outcome: Progressing  6/10/2025 0511 by Malia Loco RN  Outcome: Progressing     Problem:  Fall Injury Risk  Goal: Absence of Fall, Infant Drop and Related Injury  6/10/2025 0511 by Malia Loco RN  Outcome: Progressing  6/10/2025 0511 by Malia Loco RN  Outcome: Progressing

## 2025-06-11 ENCOUNTER — RESULTS FOLLOW-UP (OUTPATIENT)
Dept: MATERNAL FETAL MEDICINE | Facility: CLINIC | Age: 25
End: 2025-06-11

## 2025-06-11 ENCOUNTER — LAB VISIT (OUTPATIENT)
Dept: LAB | Facility: HOSPITAL | Age: 25
End: 2025-06-11
Attending: OBSTETRICS & GYNECOLOGY
Payer: COMMERCIAL

## 2025-06-11 DIAGNOSIS — O26.643 CHOLESTASIS DURING PREGNANCY IN THIRD TRIMESTER: Primary | ICD-10-CM

## 2025-06-11 DIAGNOSIS — O26.643 CHOLESTASIS DURING PREGNANCY IN THIRD TRIMESTER: ICD-10-CM

## 2025-06-11 LAB
ALBUMIN SERPL-MCNC: 2.7 G/DL (ref 3.5–5)
ALBUMIN/GLOB SERPL: 0.7 RATIO (ref 1.1–2)
ALP SERPL-CCNC: 192 UNIT/L (ref 40–150)
ALT SERPL-CCNC: 168 UNIT/L (ref 0–55)
ANION GAP SERPL CALC-SCNC: 9 MEQ/L
AST SERPL-CCNC: 71 UNIT/L (ref 11–45)
BILIRUB SERPL-MCNC: 1.2 MG/DL
BUN SERPL-MCNC: 5.8 MG/DL (ref 7–18.7)
CALCIUM SERPL-MCNC: 8.8 MG/DL (ref 8.4–10.2)
CHLORIDE SERPL-SCNC: 106 MMOL/L (ref 98–107)
CO2 SERPL-SCNC: 23 MMOL/L (ref 22–29)
CREAT SERPL-MCNC: 0.66 MG/DL (ref 0.55–1.02)
CREAT/UREA NIT SERPL: 9
GFR SERPLBLD CREATININE-BSD FMLA CKD-EPI: >60 ML/MIN/1.73/M2
GLOBULIN SER-MCNC: 4.1 GM/DL (ref 2.4–3.5)
GLUCOSE SERPL-MCNC: 124 MG/DL (ref 74–100)
POTASSIUM SERPL-SCNC: 3.8 MMOL/L (ref 3.5–5.1)
PROT SERPL-MCNC: 6.8 GM/DL (ref 6.4–8.3)
SODIUM SERPL-SCNC: 138 MMOL/L (ref 136–145)

## 2025-06-11 PROCEDURE — 36415 COLL VENOUS BLD VENIPUNCTURE: CPT

## 2025-06-11 PROCEDURE — 80053 COMPREHEN METABOLIC PANEL: CPT

## 2025-06-11 NOTE — CONSULTS
Consultation Note  Maternal Fetal Medicine          Subjective:         Beth Mccoy is a 25 y.o.  female with IUP at 30w1d who is admitted for Oligohydramnios and itching.    She was admitted after clinic when she was noted to have an SHEFALI of 4 cm on ultrasound.  She reported decreased fetal movement and was concerned.  Upon admission she also noted she was having itching and labs showed transaminitis that was new as far as we are aware.  She was given IV fluids with maintenance and a 500 cc bolus recently and SHEFALI was 3 cm on recheck.  MFM was consulted for further recommendations.      PMHx: History reviewed. No pertinent past medical history.    PSHx:   Past Surgical History:   Procedure Laterality Date    TONSILLECTOMY AND ADENOIDECTOMY         All: Review of patient's allergies indicates:  No Known Allergies    Meds: Prescriptions Prior to Admission[1]    SH: Social History[2]    FH:   Family History   Problem Relation Name Age of Onset    Diabetes Mother      Diabetes Father         OBHx:   OB History    Para Term  AB Living   1 0 0 0 0 0   SAB IAB Ectopic Multiple Live Births   0 0 0 0 0      # Outcome Date GA Lbr Herb/2nd Weight Sex Type Anes PTL Lv   1 Current                Objective:              Gen: NAD, A&Ox3  Pulm: Unlabored breathing, LCTAB  Card: RRR  Abd: FHT present, soft, nondistended, nontender to palpation, gravid uterus palpable c/w gestational age  Extremities: Palpable peripheral pulses, no pedal edema,  DTRs x 4    NST: 130 baseline, moderate BTBV, pos accelerations, neg decelerations  [place]: Quiet  US: breech presentation, ant placenta, SHEFALI 3cm  Repeat US- SHEFALI 4.3cm, 3 x 4cm pocket. Breech. BPP .    Lab Review  Blood Type: A POS    Recent Results (from the past 24 hours)   POCT Rupture of membrane    Collection Time: 06/10/25  9:14 AM   Result Value Ref Range    Rupture of Membrane Negative Negative     Acceptable Yes    Comprehensive Metabolic  Panel    Collection Time: 06/10/25 10:05 AM   Result Value Ref Range    Sodium 140 136 - 145 mmol/L    Potassium 3.5 3.5 - 5.1 mmol/L    Chloride 108 (H) 98 - 107 mmol/L    CO2 22 22 - 29 mmol/L    Glucose 88 74 - 100 mg/dL    Blood Urea Nitrogen 4.7 (L) 7.0 - 18.7 mg/dL    Creatinine 0.57 0.55 - 1.02 mg/dL    Calcium 8.6 8.4 - 10.2 mg/dL    Protein Total 6.1 (L) 6.4 - 8.3 gm/dL    Albumin 2.5 (L) 3.5 - 5.0 g/dL    Globulin 3.6 (H) 2.4 - 3.5 gm/dL    Albumin/Globulin Ratio 0.7 (L) 1.1 - 2.0 ratio    Bilirubin Total 1.1 <=1.5 mg/dL     (H) 40 - 150 unit/L     (H) 0 - 55 unit/L    AST 71 (H) 11 - 45 unit/L    eGFR >60 mL/min/1.73/m2    Anion Gap 10.0 mEq/L    BUN/Creatinine Ratio 8    Hepatitis Panel, Acute    Collection Time: 06/10/25 10:05 AM   Result Value Ref Range    Hep A IgM Interp Nonreactive Nonreactive    Hep B Core IgM Interp Nonreactive Nonreactive    Hep BsAg Interp Nonreactive Nonreactive    Hep C Ab Interp Nonreactive Nonreactive       Assessment:       25 y.o.  at 30w1d weeks gestation admitted for oligohydramnios and itching.    Active Hospital Problems    Diagnosis  POA    *Oligohydramnios antepartum [O41.00X0]  Yes    Intrahepatic cholestasis of pregnancy in third trimester [O26.643]  Yes      Resolved Hospital Problems   No resolved problems to display.        Plan:     1. Oligohydramnios (POA)  - ultrasound in clinic was noted to have an SHEFALI of 4.4 and was sent to the hospital for IV fluids.  She underwent maintenance fluids and a 500 cc bolus with no significant improvement in her SHEFALI (3cm).  MFM was consulted and I gave her another 500 cc bolus and repeated ultrasound after 8 hours.  - she now has an SHEFALI of 4.3 cm, however does have a 3 cm x 4 cm pocket present.  I was present during ultrasound this afternoon.  BPP is .  - ROM+ negative  - the patient was counseled about the finding of oligohydramnios.  Oligohydramnios is defined by an SHEFALI less than 5 cm, or the absence of a  2 x 2 pocket on ultrasound.  Most causes her idiopathic but can be related to medication exposure, placental insufficiency, FGR, fetal abnormalities, TORCH infections, ruptured membranes, placental abruption and more.  Outcomes are associated with oligohydramnios include increased risk of NICU admission, increased risk of preeclampsia, fetal distress, and low Apgar scores.  - after IV fluids, her amniotic fluid level has improved and is normal based on the presence of a 3 x 4 cm pocket.  Using this metric for defining amniotic fluid volume is more consistently associated with outcomes and has a higher specificity.   - regardless, I recommend we do a close interval follow-up for fluid check an office with anatomic survey as we are able based on gestational age.  She will follow up in the clinic in 2 days for fluid check.   - close monitoring for presence of preeclampsia    2. Itching (POA)   -the patient reports itching especially for palms and soles at night.  She reports the itching is severe.    -CMP shows elevated liver function testing likely associated with presumed cholestasis.  Bile acids have been collected and are pending.    -ursodiol 500 mg TID has been started empirically given her high likelihood of cholestasis and transaminitis.  -We discussed the patients diagnosis of Intrahepatic cholestasis of pregnancy (ICP). We discussed the potential etiologies, risk factors, and maternal and  complications. Patients with preexisting hepatic disease, multiple gestations, advanced maternal age, and IVF pregnancies are at higher risk of developing ICP. Pregnancies affected by ICP are at risk for  birth, meconium stained fluid, respiratory distress, stillbirth as well as maternal hepatobiliary disease.  ICP also has a high rate of recurrence in subsequent pregnancies (up to 90%).  We discussed the limitations of antepartum testing with ICP.  Management  ICP is treated with urodeoxycholic acid(UCDA)  with an initial dose of 500 mg TID with a maximum daily dose of 2000 mg. Alternatively, dosing can be initiated at 10-15 mg/kg/day and this dose can be divided into a BID or TID administration. UCDA has been shown to improve symptoms of pruritus and improve lab abnormalities however has not been shown to decrease the risk of adverse maternal or  outcomes. UCDA should be stopped postpartum. If symptoms persist, it is important to evaluate for underlying hepatic disease.  Twice weekly  fetal surveillance is recommended at 32 weeks however may be initiated earlier in consultation with MFM (we will set her up with MFM weekly on ).     Recommendations:  monitor CMP and bile acids every 1-2 weeks. I will reevaluate CMP Thursday as she has transaminitis with unclear trend.   Delivery Recommendations  In patients with ICP and serum bile acids that are greater than or equal to 100 µmol/L at any point, we recommend delivery at 36 weeks gestation.  In patients with ICP and serum bile acid levels that have always been less than 100 µmol/L, we recommend delivery at 37 weeks gestation. Exceptions to this include:  In patients who after counseling strongly desire continued expectant management, delivery can be postponed to 38 weeks gestation. In these patients, repeating bile acids at 37 weeks gestation is appropriate to make final decisions regarding delivery timing.  Delivery timing can be individualized for patients with bile acids between 40 and 99 µmol/L. After consultation with MFM and consideration of bile acid levels, other co-morbidities, obstetric history, and the risks of prematurity, delivery may be warranted at 36 weeks.  We generally recommend against delivery prior to 37 weeks gestation in patients without confirmed ICP (i.e. patients with symptoms suspicious for ICP without laboratory confirmation). In these patients, consultation with MFM may be appropriate.  Delivery between 34 and 36  weeks gestation may be considered in patients with ICP and total bile acids greater than or equal to 100 µmol/L with any of the following:  Worsening, unremitting maternal pruritus unresolved with pharmacotherapy  History of stillbirth prior to 36 weeks gestation due to ICP  Preexisting or acute hepatic disease with evidence of worsening hepatic function    Thank you for allowing us to participate in the care of your patient. If you have any questions/concerns, please do not hesitate to contact us.     Lana Marcum  Maternal Fetal Medicine               [1]   No medications prior to admission.   [2]   Social History  Socioeconomic History    Marital status: Single   Tobacco Use    Smoking status: Never    Smokeless tobacco: Never   Vaping Use    Vaping status: Never Used   Substance and Sexual Activity    Alcohol use: Not Currently     Comment: ocationally    Drug use: Never    Sexual activity: Yes     Partners: Male     Birth control/protection: OCP     Social Drivers of Health     Financial Resource Strain: Low Risk  (6/9/2025)    Overall Financial Resource Strain (CARDIA)     Difficulty of Paying Living Expenses: Not hard at all   Food Insecurity: No Food Insecurity (6/9/2025)    Hunger Vital Sign     Worried About Running Out of Food in the Last Year: Never true     Ran Out of Food in the Last Year: Never true   Transportation Needs: No Transportation Needs (6/9/2025)    PRAPARE - Transportation     Lack of Transportation (Medical): No     Lack of Transportation (Non-Medical): No   Physical Activity: Insufficiently Active (12/12/2024)    Exercise Vital Sign     Days of Exercise per Week: 5 days     Minutes of Exercise per Session: 20 min   Stress: No Stress Concern Present (6/9/2025)    [employer] - Occupational Stress Questionnaire     Feeling of Stress : Not at all   Housing Stability: Low Risk  (6/9/2025)    Housing Stability Vital Sign     Unable to Pay for Housing in the  Last Year: No     Homeless in the Last Year: No

## 2025-06-12 ENCOUNTER — PROCEDURE VISIT (OUTPATIENT)
Dept: MATERNAL FETAL MEDICINE | Facility: CLINIC | Age: 25
End: 2025-06-12
Payer: COMMERCIAL

## 2025-06-12 ENCOUNTER — OFFICE VISIT (OUTPATIENT)
Dept: MATERNAL FETAL MEDICINE | Facility: CLINIC | Age: 25
End: 2025-06-12
Payer: COMMERCIAL

## 2025-06-12 VITALS
BODY MASS INDEX: 38.89 KG/M2 | HEART RATE: 105 BPM | DIASTOLIC BLOOD PRESSURE: 81 MMHG | SYSTOLIC BLOOD PRESSURE: 121 MMHG | WEIGHT: 247.81 LBS | HEIGHT: 67 IN

## 2025-06-12 DIAGNOSIS — O41.00X0 OLIGOHYDRAMNIOS, ANTEPARTUM, SINGLE OR UNSPECIFIED FETUS: ICD-10-CM

## 2025-06-12 DIAGNOSIS — O26.643 CHOLESTASIS DURING PREGNANCY IN THIRD TRIMESTER: ICD-10-CM

## 2025-06-12 DIAGNOSIS — O26.643 CHOLESTASIS DURING PREGNANCY IN THIRD TRIMESTER: Primary | ICD-10-CM

## 2025-06-12 DIAGNOSIS — O36.5930 POOR FETAL GROWTH AFFECTING MANAGEMENT OF MOTHER IN THIRD TRIMESTER, SINGLE OR UNSPECIFIED FETUS: ICD-10-CM

## 2025-06-12 LAB
BILE AC SERPL-SCNC: 121.36 NMOL/ML
CDCAE SERPL-SCNC: 42.34 NMOL/ML
CHOLATE SERPL-SCNC: 61.03 NMOL/ML
DO-CHOLATE SERPL-SCNC: 15.4 NMOL/ML
PATH REV: NORMAL
URSODEOXYCHOLATE SERPL-SCNC: 2.59 NMOL/ML

## 2025-06-12 RX ORDER — ASCORBIC ACID, CHOLECALCIFEROL, .ALPHA.-TOCOPHEROL ACETATE, DL-, PYRIDOXINE, FOLIC ACID, CYANOCOBALAMIN, CALCIUM, FERROUS FUMARATE, MAGNESIUM, DOCONEXENT 85; 200; 10; 25; 1; 12; 140; 27; 45; 300 [IU]/1; [IU]/1; [IU]/1; [IU]/1; MG/1; UG/1; MG/1; MG/1; MG/1; MG/1
1 CAPSULE, GELATIN COATED ORAL
COMMUNITY
Start: 2025-04-01

## 2025-06-12 NOTE — ASSESSMENT & PLAN NOTE
FGR: The patients fetus has been diagnosed with FGR based on EFW < 10th percentile/AC < 10th percentile. Potential etiologies of FGR include but are not limited to normal variation of stature, placental insufficiency, chromosomal abnormalities, genetic disorders, infections, medical conditions, teratogen exposure and other etiologies. Pregnancies complicated by FGR are at increased risk of stillbirth. We discussed delivery timing and recommendations for antepartum testing.     25 : EFW 16%, AC 2%. BPP 8/8. AFV normal. Umbilical artery dopplers demonstrate diastolic flow with normal S/D ratio.     Recommendations:  Start twice weekly  fetal surveillance with NST and BPP until delivery (BPP/UAD/AFV at [facility] office at the end of the week; NST at primary OB at the beginning of the week)  Start weekly UA dopplers (see above)  Repeat fetal growth ultrasound in 3 weeks (scheduled through [facility])  Patient counseled re: fetal movement monitoring and decreased fetal movement  Monitor closely for any signs of evolving preeclampsia.  If  testing is non-reassuring, delivery may be warranted regardless of GA.  For all pregnancies with FGR, the placenta should be sent to pathology for examination.    General delivery timing: See Cholestasis section    Exceptions to these recommendations may occur (e.g. gestational age <26 weeks) However, in general, delivery should be considered when:    FGR (< 3rd percentile)    Normal testing, no co-morbid conditions: 37 0/7- 37 6/7    Co-morbid conditions: 36 0/7- 37 6/7 weeks      o Maternal co-morbid conditions (e.g. CHTN, pregestational DM, renal disease, autoimmune disease, AMA >= 40)    FGR (EFW 3rd-9th or EFW > 10th with AC < 10th percentile)  Normal testing, No co-morbid conditions: 380/7- 386/7  UA Doppler S/D ratio > 95th percentile: 370/7-376/7  Co-morbid conditions: 370/7-376/7 (see above)  EFW as opposed to AC percentile should be used to determine delivery  timing    Earlier delivery can be considered in conjunction with MFM in the setting of FGR with preeclampsia/gestational hypertension (360/7-370/7 weeks), multifetal pregnancy, or UA Doppler abnormalities (ie EFW < 3rd percentile with elevated UA Dopplers)     FGR + Oligohydramnios: At diagnosis, if GA >= 34 weeks (if less than 34 weeks, management needs to be individualized based on other testing and entire clinical scenario)    FGR + AEDF: By 34 weeks (33 0/7-34 6/7 weeks) if there is absent diastolic flow in the umbilical artery and there has not been a previous indication for delivery    FGR + REDF: By 32 weeks (30 0/7-32 6/7 weeks), if there is reversed diastolic flow in the umbilical artery and there has not been a previous indication for delivery

## 2025-06-12 NOTE — ASSESSMENT & PLAN NOTE
Normal PFT. Today we discussed the patients diagnosis of Intrahepatic cholestasis of pregnancy (ICP). We discussed the potential etiologies, risk factors, and maternal and  complications. Patients with preexisting hepatic disease, multiple gestations, advanced maternal age, and IVF pregnancies are at higher risk of developing ICP. Pregnancies affected by ICP are at risk for  birth, meconium stained fluid, respiratory distress, stillbirth as well as maternal hepatobiliary disease.  ICP also has a high rate of recurrence in subsequent pregnancies (up to 90%).  We discussed the limitations of antepartum testing with ICP.     Diagnosis  The diagnosis is made based on clinical symptoms and serum bile acid levels (pending) in the absence of other diseases that could cause similar symptoms and lab findings. Serum bile acid levels ideally should be collected after eight hours of fasting for optimal results. However, any level greater than 10 µmol/L is considered a positive result regardless of whether fasting or random.     We are presuming that she has cholestasis of pregnancy based on symptoms and transaminitis.  Bile acid testing is pending.  She has been empirically started on ursodiol but has not yet started the medication due to not yet getting it from the pharmacy.     Management  ICP is treated with urodeoxycholic acid(UCDA) with an initial dose of 500 mg TID with a maximum daily dose of 2000 mg. Alternatively, dosing can be initiated at 10-15 mg/kg/day and this dose can be divided into a BID or TID administration. UCDA has been shown to improve symptoms of pruritus and improve lab abnormalities however has not been shown to decrease the risk of adverse maternal or  outcomes. UCDA should be stopped postpartum. If symptoms persist, it is important to evaluate for underlying hepatic disease.  Twice weekly  fetal surveillance is recommended at 32 weeks however may be initiated earlier in  consultation with MFM.     Recommendations:  CMP, BA weekly- ordered for next week (draw on Wed/ Thursday)  Delivery Recommendations  In patients with ICP and serum bile acids that are greater than or equal to 100 µmol/L at any point, we recommend delivery at 36 weeks gestation.  In patients with ICP and serum bile acid levels that have always been less than 100 µmol/L, we recommend delivery at 37 weeks gestation. Exceptions to this include:  In patients who after counseling strongly desire continued expectant management, delivery can be postponed to 38 weeks gestation. In these patients, repeating bile acids at 37 weeks gestation is appropriate to make final decisions regarding delivery timing.  Delivery timing can be individualized for patients with bile acids between 40 and 99 µmol/L. After consultation with MFM and consideration of bile acid levels, other co-morbidities, obstetric history, and the risks of prematurity, delivery may be warranted at 36 weeks.  We generally recommend against delivery prior to 37 weeks gestation in patients without confirmed ICP (i.e. patients with symptoms suspicious for ICP without laboratory confirmation). In these patients, consultation with MFM may be appropriate.  Delivery between 34 and 36 weeks gestation may be considered in patients with ICP and total bile acids greater than or equal to 100 µmol/L with any of the following:  Worsening, unremitting maternal pruritus unresolved with pharmacotherapy  History of stillbirth prior to 36 weeks gestation due to ICP  Preexisting or acute hepatic disease with evidence of worsening hepatic function  In patients with multiple gestations, earlier delivery timing is warranted.  In general, delivery should be recommended 2-3 weeks earlier than the standard delivery recommendations based on chronicity. (for example, dichorionic twins 35-36 weeks; monochorionic twins 34-35 weeks)

## 2025-06-12 NOTE — ASSESSMENT & PLAN NOTE
Oligohydramnios noted on previous ultrasound at OB office and in the hospital.  This resolved with IV fluid hydration.    Today on ultrasound amniotic fluid volume is normal with an SHEFALI of 9 cm, 2 x 2 pocket is present.    She will be getting twice weekly ultrasound and NST testing due to cholestasis in growth restriction.  We will monitor fluid levels.

## 2025-06-12 NOTE — PROGRESS NOTES
MATERNAL-FETAL MEDICINE   CONSULT NOTE    Provider requesting consultation: Dr. Rodriguez    SUBJECTIVE:     Ms. Beth Mccoy is a 25 y.o.  female with IUP at 30w2d who is seen in consultation by MFM for evaluation and management of:  Problem   Poor Fetal Growth Affecting Management of Mother in Third Trimester   Oligohydramnios Antepartum   Cholestasis During Pregnancy in Third Trimester        The patient is seen in hospital follow-up due to recent diagnosis of oligohydramnios on ultrasound as well as presumed cholestasis with significant itching.  The patient was seen in the hospital with IV fluids and amniotic fluid volume increased appropriately.  Liver function testing showed transaminitis concerning for cholestasis and she was empirically started on ursodiol 500 mg TID.  She states that she has not been able to start this medicine since discharge because she has not been to the pharmacy yet.  She is still having itching.    Medication List with Changes/Refills   Current Medications    PNV-DHA 27 MG IRON-1 MG -300 MG CAP    Take 1 capsule by mouth.    TRIAMCINOLONE ACETONIDE 0.1% (KENALOG) 0.1 % CREAM    Apply topically 2 (two) times daily.    URSODIOL (ACTIGALL) 250 MG TAB    Take 2 tablets (500 mg total) by mouth 3 (three) times daily with meals.   Discontinued Medications    PNV,CALCIUM 72-IRON-FOLIC ACID (PRENATAL VITAMIN PLUS LOW IRON) 27 MG IRON- 1 MG TAB    Take 1 tablet (1 each total) by mouth once daily.       Review of patient's allergies indicates:  No Known Allergies    PMH:History reviewed. No pertinent past medical history.    PObHx:  OB History    Para Term  AB Living   1        SAB IAB Ectopic Multiple Live Births             # Outcome Date GA Lbr Herb/2nd Weight Sex Type Anes PTL Lv   1 Current                PSH:  Past Surgical History:   Procedure Laterality Date    TONSILLECTOMY AND ADENOIDECTOMY         Family history:family history includes Arthritis in her  "mother; Cancer in her maternal grandmother; Diabetes in her father and mother; Heart disease in her maternal grandfather.    Social history: reports that she has never smoked. She has never used smokeless tobacco. She reports that she does not currently use alcohol. She reports that she does not use drugs.    Genetic history:  The patient denies any inherited genetic diseases or birth defects in herself or her partner's personal history or family.    Objective:   /81 (BP Location: Right arm, Patient Position: Sitting)   Pulse 105   Ht 5' 7" (1.702 m)   Wt 112.4 kg (247 lb 12.8 oz)   LMP 2024 (Exact Date)   BMI 38.81 kg/m²       Ultrasound performed. See viewpoint for full ultrasound report.  A viable camejo pregnancy is visualized in breech presentation. Estimated fetal weight is at the 16th percentile with an abdominal circumference at the 2nd percentile, consistent with intrauterine growth restriction (new diagnosis). No fetal abnormalities are noted today and detailed anatomic survey remain suboptimal secondary to late gestational age and poor visualization. Amniotic fluid volume is normal with an SHEFALI 9cm (2.6 x 2.7cm pocket also visible). Umbilical artery Dopplers are normal. Placenta is anterior. Biophysical profile is 8/8.    Significant labs/imaging:  See CMP data- stable LFTs    ASSESSMENT/PLAN:     25 y.o.  female with IUP at 30w2d     Assessment & Plan  Cholestasis during pregnancy in third trimester  Today we discussed the patients diagnosis of Intrahepatic cholestasis of pregnancy (ICP). We discussed the potential etiologies, risk factors, and maternal and  complications. Patients with preexisting hepatic disease, multiple gestations, advanced maternal age, and IVF pregnancies are at higher risk of developing ICP. Pregnancies affected by ICP are at risk for  birth, meconium stained fluid, respiratory distress, stillbirth as well as maternal hepatobiliary disease.  " ICP also has a high rate of recurrence in subsequent pregnancies (up to 90%).  We discussed the limitations of antepartum testing with ICP.     Diagnosis  The diagnosis is made based on clinical symptoms and serum bile acid levels (pending) in the absence of other diseases that could cause similar symptoms and lab findings. Serum bile acid levels ideally should be collected after eight hours of fasting for optimal results. However, any level greater than 10 µmol/L is considered a positive result regardless of whether fasting or random.     We are presuming that she has cholestasis of pregnancy based on symptoms and transaminitis.  Bile acid testing is pending.  She has been empirically started on ursodiol but has not yet started the medication due to not yet getting it from the pharmacy.     Management  ICP is treated with urodeoxycholic acid(UCDA) with an initial dose of 500 mg TID with a maximum daily dose of 2000 mg. Alternatively, dosing can be initiated at 10-15 mg/kg/day and this dose can be divided into a BID or TID administration. UCDA has been shown to improve symptoms of pruritus and improve lab abnormalities however has not been shown to decrease the risk of adverse maternal or  outcomes. UCDA should be stopped postpartum. If symptoms persist, it is important to evaluate for underlying hepatic disease.  Twice weekly  fetal surveillance is recommended at 32 weeks however may be initiated earlier in consultation with MFM.     Recommendations:  CMP, BA weekly- ordered for next week (draw on )  Delivery Recommendations  In patients with ICP and serum bile acids that are greater than or equal to 100 µmol/L at any point, we recommend delivery at 36 weeks gestation.  In patients with ICP and serum bile acid levels that have always been less than 100 µmol/L, we recommend delivery at 37 weeks gestation. Exceptions to this include:  In patients who after counseling strongly desire  continued expectant management, delivery can be postponed to 38 weeks gestation. In these patients, repeating bile acids at 37 weeks gestation is appropriate to make final decisions regarding delivery timing.  Delivery timing can be individualized for patients with bile acids between 40 and 99 µmol/L. After consultation with MFM and consideration of bile acid levels, other co-morbidities, obstetric history, and the risks of prematurity, delivery may be warranted at 36 weeks.  We generally recommend against delivery prior to 37 weeks gestation in patients without confirmed ICP (i.e. patients with symptoms suspicious for ICP without laboratory confirmation). In these patients, consultation with MFM may be appropriate.  Delivery between 34 and 36 weeks gestation may be considered in patients with ICP and total bile acids greater than or equal to 100 µmol/L with any of the following:  Worsening, unremitting maternal pruritus unresolved with pharmacotherapy  History of stillbirth prior to 36 weeks gestation due to ICP  Preexisting or acute hepatic disease with evidence of worsening hepatic function  In patients with multiple gestations, earlier delivery timing is warranted.  In general, delivery should be recommended 2-3 weeks earlier than the standard delivery recommendations based on chronicity. (for example, dichorionic twins 35-36 weeks; monochorionic twins 34-35 weeks)    Poor fetal growth affecting management of mother in third trimester, single or unspecified fetus  FGR: The patients fetus has been diagnosed with FGR based on EFW < 10th percentile/AC < 10th percentile. Potential etiologies of FGR include but are not limited to normal variation of stature, placental insufficiency, chromosomal abnormalities, genetic disorders, infections, medical conditions, teratogen exposure and other etiologies. Pregnancies complicated by FGR are at increased risk of stillbirth. We discussed delivery timing and recommendations  for antepartum testing.     25 : EFW 16%, AC 2%. BPP 8/8. AFV normal. Umbilical artery dopplers demonstrate diastolic flow with normal S/D ratio.     Recommendations:  Start twice weekly  fetal surveillance with NST and BPP until delivery (BPP/UAD/AFV at M office at the end of the week; NST at primary OB at the beginning of the week)  Start weekly UA dopplers (see above)  Repeat fetal growth ultrasound in 3 weeks (scheduled through [facility])  Patient counseled re: fetal movement monitoring and decreased fetal movement  Monitor closely for any signs of evolving preeclampsia.  If  testing is non-reassuring, delivery may be warranted regardless of GA.  For all pregnancies with FGR, the placenta should be sent to pathology for examination.    General delivery timing: See Cholestasis section    Exceptions to these recommendations may occur (e.g. gestational age <26 weeks) However, in general, delivery should be considered when:    FGR (< 3rd percentile)    Normal testing, no co-morbid conditions: 37 0/7- 37 6/7    Co-morbid conditions: 36 0/7- 37 6/7 weeks      o Maternal co-morbid conditions (e.g. CHTN, pregestational DM, renal disease, autoimmune disease, AMA >= 40)    FGR (EFW 3rd-9th or EFW > 10th with AC < 10th percentile)  Normal testing, No co-morbid conditions: 380/7- 386/7  UA Doppler S/D ratio > 95th percentile: 370/7-376/7  Co-morbid conditions: 370/7-376/7 (see above)  EFW as opposed to AC percentile should be used to determine delivery timing    Earlier delivery can be considered in conjunction with MFM in the setting of FGR with preeclampsia/gestational hypertension (360/7-370/7 weeks), multifetal pregnancy, or UA Doppler abnormalities (ie EFW < 3rd percentile with elevated UA Dopplers)     FGR + Oligohydramnios: At diagnosis, if GA >= 34 weeks (if less than 34 weeks, management needs to be individualized based on other testing and entire clinical scenario)    FGR + AEDF: By 34 weeks (33  0/7-34 6/7 weeks) if there is absent diastolic flow in the umbilical artery and there has not been a previous indication for delivery    FGR + REDF: By 32 weeks (30 0/7-32 6/7 weeks), if there is reversed diastolic flow in the umbilical artery and there has not been a previous indication for delivery    Oligohydramnios, antepartum, single or unspecified fetus  Oligohydramnios noted on previous ultrasound at OB office and in the hospital.  This resolved with IV fluid hydration.    Today on ultrasound amniotic fluid volume is normal with an SHEFALI of 9 cm, 2 x 2 pocket is present.    She will be getting twice weekly ultrasound and NST testing due to cholestasis in growth restriction.  We will monitor fluid levels.      FOLLOW UP:   Weekly BPP/ Dopplers on Thursdays      Lana Marcum  Maternal-Fetal Medicine    Electronically Signed by Lana Marcum June 12, 2025

## 2025-06-17 ENCOUNTER — LAB VISIT (OUTPATIENT)
Dept: LAB | Facility: HOSPITAL | Age: 25
End: 2025-06-17
Attending: OBSTETRICS & GYNECOLOGY
Payer: COMMERCIAL

## 2025-06-17 DIAGNOSIS — O26.643 CHOLESTASIS DURING PREGNANCY IN THIRD TRIMESTER: ICD-10-CM

## 2025-06-17 LAB
ALBUMIN SERPL-MCNC: 2.9 G/DL (ref 3.5–5)
ALBUMIN/GLOB SERPL: 0.8 RATIO (ref 1.1–2)
ALP SERPL-CCNC: 191 UNIT/L (ref 40–150)
ALT SERPL-CCNC: 55 UNIT/L (ref 0–55)
ANION GAP SERPL CALC-SCNC: 9 MEQ/L
AST SERPL-CCNC: 31 UNIT/L (ref 11–45)
BILIRUB SERPL-MCNC: 1.3 MG/DL
BUN SERPL-MCNC: 5.9 MG/DL (ref 7–18.7)
CALCIUM SERPL-MCNC: 9 MG/DL (ref 8.4–10.2)
CHLORIDE SERPL-SCNC: 107 MMOL/L (ref 98–107)
CO2 SERPL-SCNC: 23 MMOL/L (ref 22–29)
CREAT SERPL-MCNC: 0.64 MG/DL (ref 0.55–1.02)
CREAT/UREA NIT SERPL: 9
GFR SERPLBLD CREATININE-BSD FMLA CKD-EPI: >60 ML/MIN/1.73/M2
GLOBULIN SER-MCNC: 3.8 GM/DL (ref 2.4–3.5)
GLUCOSE SERPL-MCNC: 82 MG/DL (ref 74–100)
POTASSIUM SERPL-SCNC: 3.9 MMOL/L (ref 3.5–5.1)
PROT SERPL-MCNC: 6.7 GM/DL (ref 6.4–8.3)
SODIUM SERPL-SCNC: 139 MMOL/L (ref 136–145)

## 2025-06-17 PROCEDURE — 36415 COLL VENOUS BLD VENIPUNCTURE: CPT

## 2025-06-17 PROCEDURE — 80053 COMPREHEN METABOLIC PANEL: CPT

## 2025-06-18 ENCOUNTER — OFFICE VISIT (OUTPATIENT)
Dept: MATERNAL FETAL MEDICINE | Facility: CLINIC | Age: 25
End: 2025-06-18
Payer: COMMERCIAL

## 2025-06-18 ENCOUNTER — PROCEDURE VISIT (OUTPATIENT)
Dept: MATERNAL FETAL MEDICINE | Facility: CLINIC | Age: 25
End: 2025-06-18
Payer: COMMERCIAL

## 2025-06-18 VITALS
BODY MASS INDEX: 38.26 KG/M2 | HEART RATE: 114 BPM | HEIGHT: 67 IN | WEIGHT: 243.75 LBS | DIASTOLIC BLOOD PRESSURE: 83 MMHG | HEART RATE: 114 BPM | SYSTOLIC BLOOD PRESSURE: 126 MMHG | SYSTOLIC BLOOD PRESSURE: 126 MMHG | DIASTOLIC BLOOD PRESSURE: 83 MMHG

## 2025-06-18 DIAGNOSIS — O26.643 CHOLESTASIS DURING PREGNANCY IN THIRD TRIMESTER: Primary | ICD-10-CM

## 2025-06-18 DIAGNOSIS — O41.00X0 OLIGOHYDRAMNIOS, ANTEPARTUM, SINGLE OR UNSPECIFIED FETUS: ICD-10-CM

## 2025-06-18 DIAGNOSIS — R10.13 EPIGASTRIC PAIN: ICD-10-CM

## 2025-06-18 DIAGNOSIS — R10.11 RUQ ABDOMINAL PAIN: Primary | ICD-10-CM

## 2025-06-18 DIAGNOSIS — R10.11 RIGHT UPPER QUADRANT PAIN: Primary | ICD-10-CM

## 2025-06-18 DIAGNOSIS — O26.643 CHOLESTASIS DURING PREGNANCY IN THIRD TRIMESTER: ICD-10-CM

## 2025-06-18 DIAGNOSIS — O36.5930 POOR FETAL GROWTH AFFECTING MANAGEMENT OF MOTHER IN THIRD TRIMESTER, SINGLE OR UNSPECIFIED FETUS: ICD-10-CM

## 2025-06-18 NOTE — ASSESSMENT & PLAN NOTE
Today we discussed the patients diagnosis of Intrahepatic cholestasis of pregnancy (ICP). We discussed the potential etiologies, risk factors, and maternal and  complications. Patients with preexisting hepatic disease, multiple gestations, advanced maternal age, and IVF pregnancies are at higher risk of developing ICP. Pregnancies affected by ICP are at risk for  birth, meconium stained fluid, respiratory distress, stillbirth as well as maternal hepatobiliary disease.  ICP also has a high rate of recurrence in subsequent pregnancies (up to 90%).  We discussed the limitations of antepartum testing with ICP.     Diagnosis  The diagnosis is made based on clinical symptoms and serum bile acid levels (pending) in the absence of other diseases that could cause similar symptoms and lab findings. Serum bile acid levels ideally should be collected after eight hours of fasting for optimal results. However, any level greater than 10 µmol/L is considered a positive result regardless of whether fasting or random.        Management  ICP is treated with urodeoxycholic acid(UCDA) with an initial dose of 500 mg TID with a maximum daily dose of 2000 mg. Alternatively, dosing can be initiated at 10-15 mg/kg/day and this dose can be divided into a BID or TID administration. UCDA has been shown to improve symptoms of pruritus and improve lab abnormalities however has not been shown to decrease the risk of adverse maternal or  outcomes. UCDA should be stopped postpartum. If symptoms persist, it is important to evaluate for underlying hepatic disease.  Twice weekly  fetal surveillance is recommended at 32 weeks however may be initiated earlier in consultation with M.    618- bile acids have returned at 121 (significantly elevated).  LFTs have normalized.  Next CMP will be in 1 week.  Overall she has tolerated the medication well but she is having some GI disturbance consistent with gallbladder disease  which could be an underlying cause of her cholestasis.  I recommend right upper quadrant ultrasound which we will schedule as an outpatient.  I asked her to follow a low-fat diet at this time.     Recommendations:  CMP, BA weekly- ordered for next week (draw on Wed/ Thursday)  Continue ursodiol  Right upper quadrant ultrasound ordered  Delivery Recommendations  In patients with ICP and serum bile acids that are greater than or equal to 100 µmol/L at any point, we recommend delivery at 36 weeks gestation.  Delivery between 34 and 36 weeks gestation may be considered in patients with ICP and total bile acids greater than or equal to 100 µmol/L with any of the following:  Worsening, unremitting maternal pruritus unresolved with pharmacotherapy  History of stillbirth prior to 36 weeks gestation due to ICP  Preexisting or acute hepatic disease with evidence of worsening hepatic function

## 2025-06-18 NOTE — ASSESSMENT & PLAN NOTE
FGR: The patients fetus has been diagnosed with FGR based on EFW < 10th percentile/AC < 10th percentile. Potential etiologies of FGR include but are not limited to normal variation of stature, placental insufficiency, chromosomal abnormalities, genetic disorders, infections, medical conditions, teratogen exposure and other etiologies. Pregnancies complicated by FGR are at increased risk of stillbirth. We discussed delivery timing and recommendations for antepartum testing.     25 : EFW 16%, AC 2%. BPP . AFV normal. Umbilical artery dopplers demonstrate diastolic flow with normal S/D ratio.     Recommendations:  Start twice weekly  fetal surveillance with NST and BPP until delivery (BPP/UAD/AFV at M office at the end of the week; NST at primary OB at the beginning of the week)  Start weekly UA dopplers (see above)  Repeat fetal growth ultrasound in 3 weeks (scheduled through [facility])  Patient counseled re: fetal movement monitoring and decreased fetal movement  Monitor closely for any signs of evolving preeclampsia.  If  testing is non-reassuring, delivery may be warranted regardless of GA.  For all pregnancies with FGR, the placenta should be sent to pathology for examination.    General delivery timing: See Cholestasis section

## 2025-06-18 NOTE — PROGRESS NOTES
"  MATERNAL-FETAL MEDICINE PROGRESS NOTE        SUBJECTIVE:     Ms. Beth Mccoy is a 25 y.o.  female with IUP at 31w1d who is seen in consultation by MFM for evaluation and management of:  Problem   Poor Fetal Growth Affecting Management of Mother in Third Trimester   Cholestasis During Pregnancy in Third Trimester       The patient was seen in follow-up due to nausea and vomiting after starting Actigall.  She states she threw up  night.  She has also had some pain after eating suspicious for gallbladder disease.  She has no fever.  She has not had any other vomiting or diarrhea.  She is constipated.  LFTs have normalized.       Objective:   /83 (BP Location: Right arm, Patient Position: Sitting)   Pulse (!) 114   Ht 5' 7" (1.702 m)   Wt 110.6 kg (243 lb 11.5 oz)   LMP 2024 (Exact Date)   BMI 38.17 kg/m²       Ultrasound performed. See viewpoint for full ultrasound report.  A viable camejo pregnancy is seen in breech presentation. No abnormalities are seen. Amniotic fluid volume is normal (10cm). Placenta is anterior. Biophysical profile is 8/8. Umbilical artery Dopplers are normal.    Significant labs/imaging:  See CMP data- stable LFTs    ASSESSMENT/PLAN:     25 y.o.  female with IUP at 31w1d     Assessment & Plan  Cholestasis during pregnancy in third trimester  Today we discussed the patients diagnosis of Intrahepatic cholestasis of pregnancy (ICP). We discussed the potential etiologies, risk factors, and maternal and  complications. Patients with preexisting hepatic disease, multiple gestations, advanced maternal age, and IVF pregnancies are at higher risk of developing ICP. Pregnancies affected by ICP are at risk for  birth, meconium stained fluid, respiratory distress, stillbirth as well as maternal hepatobiliary disease.  ICP also has a high rate of recurrence in subsequent pregnancies (up to 90%).  We discussed the limitations of antepartum testing with " ICP.     Diagnosis  The diagnosis is made based on clinical symptoms and serum bile acid levels (pending) in the absence of other diseases that could cause similar symptoms and lab findings. Serum bile acid levels ideally should be collected after eight hours of fasting for optimal results. However, any level greater than 10 µmol/L is considered a positive result regardless of whether fasting or random.        Management  ICP is treated with urodeoxycholic acid(UCDA) with an initial dose of 500 mg TID with a maximum daily dose of 2000 mg. Alternatively, dosing can be initiated at 10-15 mg/kg/day and this dose can be divided into a BID or TID administration. UCDA has been shown to improve symptoms of pruritus and improve lab abnormalities however has not been shown to decrease the risk of adverse maternal or  outcomes. UCDA should be stopped postpartum. If symptoms persist, it is important to evaluate for underlying hepatic disease.  Twice weekly  fetal surveillance is recommended at 32 weeks however may be initiated earlier in consultation with MFM.    618- bile acids have returned at 121 (significantly elevated).  LFTs have normalized.  Next CMP will be in 1 week.  Overall she has tolerated the medication well but she is having some GI disturbance consistent with gallbladder disease which could be an underlying cause of her cholestasis.  I recommend right upper quadrant ultrasound which we will schedule as an outpatient.  I asked her to follow a low-fat diet at this time.     Recommendations:  CMP, BA weekly- ordered for next week (draw on Wed/ Thursday)  Continue ursodiol  Right upper quadrant ultrasound ordered  Delivery Recommendations  In patients with ICP and serum bile acids that are greater than or equal to 100 µmol/L at any point, we recommend delivery at 36 weeks gestation.  Delivery between 34 and 36 weeks gestation may be considered in patients with ICP and total bile acids greater than  or equal to 100 µmol/L with any of the following:  Worsening, unremitting maternal pruritus unresolved with pharmacotherapy  History of stillbirth prior to 36 weeks gestation due to ICP  Preexisting or acute hepatic disease with evidence of worsening hepatic function      Poor fetal growth affecting management of mother in third trimester, single or unspecified fetus  FGR: The patients fetus has been diagnosed with FGR based on EFW < 10th percentile/AC < 10th percentile. Potential etiologies of FGR include but are not limited to normal variation of stature, placental insufficiency, chromosomal abnormalities, genetic disorders, infections, medical conditions, teratogen exposure and other etiologies. Pregnancies complicated by FGR are at increased risk of stillbirth. We discussed delivery timing and recommendations for antepartum testing.     25 : EFW 16%, AC 2%. BPP . AFV normal. Umbilical artery dopplers demonstrate diastolic flow with normal S/D ratio.     Recommendations:  Start twice weekly  fetal surveillance with NST and BPP until delivery (BPP/UAD/AFV at M office at the end of the week; NST at primary OB at the beginning of the week)  Start weekly UA dopplers (see above)  Repeat fetal growth ultrasound in 3 weeks (scheduled through [facility])  Patient counseled re: fetal movement monitoring and decreased fetal movement  Monitor closely for any signs of evolving preeclampsia.  If  testing is non-reassuring, delivery may be warranted regardless of GA.  For all pregnancies with FGR, the placenta should be sent to pathology for examination.    General delivery timing: See Cholestasis section        FOLLOW UP:   Weekly BPP/ Dopplers on       Lana Marcum  Maternal-Fetal Medicine    Electronically Signed by Lana Marcum 2025

## 2025-06-19 DIAGNOSIS — R10.11 RIGHT UPPER QUADRANT PAIN: Primary | ICD-10-CM

## 2025-06-23 ENCOUNTER — LAB VISIT (OUTPATIENT)
Dept: LAB | Facility: HOSPITAL | Age: 25
End: 2025-06-23
Attending: OBSTETRICS & GYNECOLOGY
Payer: COMMERCIAL

## 2025-06-23 DIAGNOSIS — O26.643 CHOLESTASIS DURING PREGNANCY IN THIRD TRIMESTER: ICD-10-CM

## 2025-06-23 LAB
ALBUMIN SERPL-MCNC: 2.9 G/DL (ref 3.5–5)
ALBUMIN/GLOB SERPL: 0.8 RATIO (ref 1.1–2)
ALP SERPL-CCNC: 219 UNIT/L (ref 40–150)
ALT SERPL-CCNC: 37 UNIT/L (ref 0–55)
ANION GAP SERPL CALC-SCNC: 11 MEQ/L
AST SERPL-CCNC: 44 UNIT/L (ref 11–45)
BILIRUB SERPL-MCNC: 1 MG/DL
BUN SERPL-MCNC: 7 MG/DL (ref 7–18.7)
CALCIUM SERPL-MCNC: 8.9 MG/DL (ref 8.4–10.2)
CHLORIDE SERPL-SCNC: 104 MMOL/L (ref 98–107)
CO2 SERPL-SCNC: 23 MMOL/L (ref 22–29)
CREAT SERPL-MCNC: 0.64 MG/DL (ref 0.55–1.02)
CREAT/UREA NIT SERPL: 11
GFR SERPLBLD CREATININE-BSD FMLA CKD-EPI: >60 ML/MIN/1.73/M2
GLOBULIN SER-MCNC: 3.6 GM/DL (ref 2.4–3.5)
GLUCOSE SERPL-MCNC: 116 MG/DL (ref 74–100)
POTASSIUM SERPL-SCNC: 4 MMOL/L (ref 3.5–5.1)
PROT SERPL-MCNC: 6.5 GM/DL (ref 6.4–8.3)
SODIUM SERPL-SCNC: 138 MMOL/L (ref 136–145)

## 2025-06-23 PROCEDURE — 36415 COLL VENOUS BLD VENIPUNCTURE: CPT

## 2025-06-23 PROCEDURE — 82239 BILE ACIDS TOTAL: CPT

## 2025-06-23 PROCEDURE — 80053 COMPREHEN METABOLIC PANEL: CPT

## 2025-06-24 LAB — BILE AC SERPL-SCNC: 183 MCMOL/L

## 2025-06-25 ENCOUNTER — RESULTS FOLLOW-UP (OUTPATIENT)
Dept: MATERNAL FETAL MEDICINE | Facility: CLINIC | Age: 25
End: 2025-06-25

## 2025-06-26 ENCOUNTER — HOSPITAL ENCOUNTER (OUTPATIENT)
Dept: RADIOLOGY | Facility: HOSPITAL | Age: 25
Discharge: HOME | End: 2025-06-26
Attending: OBSTETRICS & GYNECOLOGY
Payer: COMMERCIAL

## 2025-06-26 ENCOUNTER — PROCEDURE VISIT (OUTPATIENT)
Dept: MATERNAL FETAL MEDICINE | Facility: CLINIC | Age: 25
End: 2025-06-26
Payer: COMMERCIAL

## 2025-06-26 VITALS — HEART RATE: 99 BPM | SYSTOLIC BLOOD PRESSURE: 125 MMHG | DIASTOLIC BLOOD PRESSURE: 84 MMHG

## 2025-06-26 DIAGNOSIS — O26.643 CHOLESTASIS DURING PREGNANCY IN THIRD TRIMESTER: ICD-10-CM

## 2025-06-26 DIAGNOSIS — O41.00X0 OLIGOHYDRAMNIOS, ANTEPARTUM, SINGLE OR UNSPECIFIED FETUS: ICD-10-CM

## 2025-06-26 DIAGNOSIS — R10.11 RIGHT UPPER QUADRANT PAIN: ICD-10-CM

## 2025-06-26 DIAGNOSIS — O26.643 CHOLESTASIS DURING PREGNANCY IN THIRD TRIMESTER: Primary | ICD-10-CM

## 2025-06-26 PROCEDURE — 76819 FETAL BIOPHYS PROFIL W/O NST: CPT | Mod: S$GLB,,, | Performed by: OBSTETRICS & GYNECOLOGY

## 2025-06-26 PROCEDURE — 76820 UMBILICAL ARTERY ECHO: CPT | Mod: S$GLB,,, | Performed by: OBSTETRICS & GYNECOLOGY

## 2025-06-26 PROCEDURE — 76705 ECHO EXAM OF ABDOMEN: CPT | Mod: TC

## 2025-06-26 RX ORDER — CHOLESTYRAMINE 4 G/9G
4 POWDER, FOR SUSPENSION ORAL 2 TIMES DAILY
Qty: 180 PACKET | Refills: 3 | Status: SHIPPED | OUTPATIENT
Start: 2025-06-26 | End: 2026-06-26

## 2025-06-26 NOTE — PROGRESS NOTES
BPP 8/8 today.  Recent bile acid level returned greater than 180 (increasing).  LFTs are normal.  The patient is feeling overall okay.    She is taking ursodiol as scheduled 500 mg TID and not missing doses.  Today I discussed adding cholestyramine to her regimen.  This was sent to her pharmacy.  Next lab draw will be Monday for CMP and bile acid level.    Right upper quadrant ultrasound is pending.  She did this morning in the results are not back yet.  It is suggests gallstone disease without cholecystitis.    We will continue to follow up twice weekly.  Delivery at this time will be no later than 36 weeks.    Lana Marcum MD  Maternal Fetal Medicine

## 2025-06-30 ENCOUNTER — RESULTS FOLLOW-UP (OUTPATIENT)
Dept: MATERNAL FETAL MEDICINE | Facility: CLINIC | Age: 25
End: 2025-06-30

## 2025-06-30 ENCOUNTER — LAB VISIT (OUTPATIENT)
Dept: LAB | Facility: HOSPITAL | Age: 25
End: 2025-06-30
Attending: OBSTETRICS & GYNECOLOGY
Payer: COMMERCIAL

## 2025-06-30 DIAGNOSIS — O26.643 CHOLESTASIS DURING PREGNANCY IN THIRD TRIMESTER: ICD-10-CM

## 2025-06-30 DIAGNOSIS — O26.643 CHOLESTASIS DURING PREGNANCY IN THIRD TRIMESTER: Primary | ICD-10-CM

## 2025-06-30 LAB
ALBUMIN SERPL-MCNC: 2.9 G/DL (ref 3.5–5)
ALBUMIN/GLOB SERPL: 0.9 RATIO (ref 1.1–2)
ALP SERPL-CCNC: 138 UNIT/L (ref 40–150)
ALT SERPL-CCNC: 19 UNIT/L (ref 0–55)
ANION GAP SERPL CALC-SCNC: 7 MEQ/L
AST SERPL-CCNC: 13 UNIT/L (ref 11–45)
BILIRUB SERPL-MCNC: 0.5 MG/DL
BUN SERPL-MCNC: 6.1 MG/DL (ref 7–18.7)
CALCIUM SERPL-MCNC: 8.9 MG/DL (ref 8.4–10.2)
CHLORIDE SERPL-SCNC: 106 MMOL/L (ref 98–107)
CO2 SERPL-SCNC: 24 MMOL/L (ref 22–29)
CREAT SERPL-MCNC: 0.63 MG/DL (ref 0.55–1.02)
CREAT/UREA NIT SERPL: 10
GFR SERPLBLD CREATININE-BSD FMLA CKD-EPI: >60 ML/MIN/1.73/M2
GLOBULIN SER-MCNC: 3.4 GM/DL (ref 2.4–3.5)
GLUCOSE SERPL-MCNC: 108 MG/DL (ref 74–100)
POTASSIUM SERPL-SCNC: 3.7 MMOL/L (ref 3.5–5.1)
PROT SERPL-MCNC: 6.3 GM/DL (ref 6.4–8.3)
SODIUM SERPL-SCNC: 137 MMOL/L (ref 136–145)

## 2025-06-30 PROCEDURE — 80053 COMPREHEN METABOLIC PANEL: CPT

## 2025-06-30 PROCEDURE — 82239 BILE ACIDS TOTAL: CPT

## 2025-06-30 PROCEDURE — 36415 COLL VENOUS BLD VENIPUNCTURE: CPT

## 2025-07-01 LAB — BILE AC SERPL-SCNC: 10 MCMOL/L

## 2025-07-02 ENCOUNTER — OFFICE VISIT (OUTPATIENT)
Dept: MATERNAL FETAL MEDICINE | Facility: CLINIC | Age: 25
End: 2025-07-02
Payer: COMMERCIAL

## 2025-07-02 ENCOUNTER — PROCEDURE VISIT (OUTPATIENT)
Dept: MATERNAL FETAL MEDICINE | Facility: CLINIC | Age: 25
End: 2025-07-02
Payer: COMMERCIAL

## 2025-07-02 VITALS
HEIGHT: 67 IN | BODY MASS INDEX: 38.46 KG/M2 | SYSTOLIC BLOOD PRESSURE: 129 MMHG | DIASTOLIC BLOOD PRESSURE: 83 MMHG | HEART RATE: 103 BPM | WEIGHT: 245.06 LBS

## 2025-07-02 DIAGNOSIS — O26.643 CHOLESTASIS DURING PREGNANCY IN THIRD TRIMESTER: ICD-10-CM

## 2025-07-02 DIAGNOSIS — O41.00X0 OLIGOHYDRAMNIOS, ANTEPARTUM, SINGLE OR UNSPECIFIED FETUS: ICD-10-CM

## 2025-07-02 DIAGNOSIS — O36.5930 POOR FETAL GROWTH AFFECTING MANAGEMENT OF MOTHER IN THIRD TRIMESTER, SINGLE OR UNSPECIFIED FETUS: ICD-10-CM

## 2025-07-02 DIAGNOSIS — O26.643 CHOLESTASIS DURING PREGNANCY IN THIRD TRIMESTER: Primary | ICD-10-CM

## 2025-07-02 NOTE — PROGRESS NOTES
"  MATERNAL-FETAL MEDICINE PROGRESS NOTE        SUBJECTIVE:     Ms. Beth Mccoy is a 25 y.o.  female with IUP at 33w1d who is seen in consultation by MFM for evaluation and management of:  Problem   Poor Fetal Growth Affecting Management of Mother in Third Trimester   Oligohydramnios Antepartum   Cholestasis During Pregnancy in Third Trimester       She is feeling well and has no current complaints.  She started taking the cholestyramine and has not had any GI disturbance.  She said she has had a good week in her itching is improved.      Objective:   /83 (BP Location: Right arm, Patient Position: Sitting)   Pulse 103   Ht 5' 7" (1.702 m)   Wt 111.1 kg (245 lb 0.7 oz)   LMP 2024 (Exact Date)   BMI 38.38 kg/m²       Ultrasound performed. See viewpoint for full ultrasound report.  A viable camejo pregnancy is visualized in breech presentation. Estimated fetal weight is at the 13th percentile with an abdominal circumference at the 7th percentile, consistent with intrauterine growth restriction (known). No fetal abnormalities are noted today and previous anatomic survey remains suboptimal. Amniotic fluid volume is normal. Umbilical artery Dopplers are normal. Placenta is anterior. Biophysical profile is 8/8.    Significant labs/imaging:  See CMP data- stable LFTs    ASSESSMENT/PLAN:     25 y.o.  female with IUP at 33w1d     Assessment & Plan  Cholestasis during pregnancy in third trimester  Today we discussed the patients diagnosis of Intrahepatic cholestasis of pregnancy (ICP). We discussed the potential etiologies, risk factors, and maternal and  complications. Patients with preexisting hepatic disease, multiple gestations, advanced maternal age, and IVF pregnancies are at higher risk of developing ICP. Pregnancies affected by ICP are at risk for  birth, meconium stained fluid, respiratory distress, stillbirth as well as maternal hepatobiliary disease.  ICP also has a " high rate of recurrence in subsequent pregnancies (up to 90%).  We discussed the limitations of antepartum testing with ICP.     Diagnosis  The diagnosis is made based on clinical symptoms and serum bile acid levels (pending) in the absence of other diseases that could cause similar symptoms and lab findings. Serum bile acid levels ideally should be collected after eight hours of fasting for optimal results. However, any level greater than 10 µmol/L is considered a positive result regardless of whether fasting or random.        Management  ICP is treated with urodeoxycholic acid(UCDA) with an initial dose of 500 mg TID with a maximum daily dose of 2000 mg. Alternatively, dosing can be initiated at 10-15 mg/kg/day and this dose can be divided into a BID or TID administration. UCDA has been shown to improve symptoms of pruritus and improve lab abnormalities however has not been shown to decrease the risk of adverse maternal or  outcomes. UCDA should be stopped postpartum. If symptoms persist, it is important to evaluate for underlying hepatic disease.  Twice weekly  fetal surveillance is recommended at 32 weeks however may be initiated earlier in consultation with MFM.    - bile acids have returned at 121 (significantly elevated).  LFTs have normalized.  Next CMP will be in 1 week.  Overall she has tolerated the medication well but she is having some GI disturbance consistent with gallbladder disease which could be an underlying cause of her cholestasis.  I recommend right upper quadrant ultrasound which we will schedule as an outpatient.  I asked her to follow a low-fat diet at this time.    - repeat bile acids returned 183 last week.  Most recent result was down to 10 which is concerning for possible lab error.  Right upper quadrant ultrasound was done due to concern for biliary colic and showed gallstones as a possible underlying cause of her cholestasis.  Due to the elevated bile acid level  she was started on cholestyramine as ursodiol was not preventing worsening of disease.  It is possible that she had choledocholithiasis that now has resolved on its own and bile acids are now improving.  She says the itching has been much better as well.  Overall, we will continue to follow LFTs and bile acids and continue to recommend delivery at 36 weeks with twice weekly antepartum testing.  We are planning for a course of steroids prior to delivery.     Recommendations:  CMP, BA weekly- ordered for next week   Continue ursodiol  Right upper quadrant ultrasound ordered  Delivery Recommendations  In patients with ICP and serum bile acids that are greater than or equal to 100 µmol/L at any point, we recommend delivery at 36 weeks gestation.  Delivery between 34 and 36 weeks gestation may be considered in patients with ICP and total bile acids greater than or equal to 100 µmol/L with any of the following:  Worsening, unremitting maternal pruritus unresolved with pharmacotherapy  History of stillbirth prior to 36 weeks gestation due to ICP  Preexisting or acute hepatic disease with evidence of worsening hepatic function      Oligohydramnios, antepartum, single or unspecified fetus  Oligohydramnios noted on previous ultrasound at OB office and in the hospital.  This resolved with IV fluid hydration.    Today on ultrasound amniotic fluid volume is normal with an SHEFALI of 9 cm, 2 x 2 pocket is present.    She will be getting twice weekly ultrasound and NST testing due to cholestasis in growth restriction.  We will monitor fluid levels.    7/2- normal SHEFALI  Poor fetal growth affecting management of mother in third trimester, single or unspecified fetus  FGR: The patients fetus has been diagnosed with FGR based on EFW < 10th percentile/AC < 10th percentile. Potential etiologies of FGR include but are not limited to normal variation of stature, placental insufficiency, chromosomal abnormalities, genetic disorders,  infections, medical conditions, teratogen exposure and other etiologies. Pregnancies complicated by FGR are at increased risk of stillbirth. We discussed delivery timing and recommendations for antepartum testing.     25 : EFW 16%, AC 2%. BPP 8/8. AFV normal. Umbilical artery dopplers demonstrate diastolic flow with normal S/D ratio.   25- EFW 13%, AC 7%. BPP 8/8. AFV normal. Umbilical artery dopplers demonstrate diastolic flow with normal S/D ratio.     Recommendations:  Start twice weekly  fetal surveillance with NST and BPP until delivery (BPP/UAD/AFV at [facility] office at the end of the week; NST at primary OB at the beginning of the week)  Start weekly UA dopplers (see above)  Repeat fetal growth ultrasound in 3 weeks (scheduled through [facility])  Patient counseled re: fetal movement monitoring and decreased fetal movement  Monitor closely for any signs of evolving preeclampsia.  If  testing is non-reassuring, delivery may be warranted regardless of GA.  For all pregnancies with FGR, the placenta should be sent to pathology for examination.    General delivery timing: See Cholestasis section        FOLLOW UP:   Weekly BPP/ Dopplers on       Lana Marcum  Maternal-Fetal Medicine    Electronically Signed by Lana Marcum 2025

## 2025-07-02 NOTE — ASSESSMENT & PLAN NOTE
FGR: The patients fetus has been diagnosed with FGR based on EFW < 10th percentile/AC < 10th percentile. Potential etiologies of FGR include but are not limited to normal variation of stature, placental insufficiency, chromosomal abnormalities, genetic disorders, infections, medical conditions, teratogen exposure and other etiologies. Pregnancies complicated by FGR are at increased risk of stillbirth. We discussed delivery timing and recommendations for antepartum testing.     25 : EFW 16%, AC 2%. BPP 8/8. AFV normal. Umbilical artery dopplers demonstrate diastolic flow with normal S/D ratio.   25- EFW 13%, AC 7%. BPP 8/8. AFV normal. Umbilical artery dopplers demonstrate diastolic flow with normal S/D ratio.     Recommendations:  Start twice weekly  fetal surveillance with NST and BPP until delivery (BPP/UAD/AFV at [facility] office at the end of the week; NST at primary OB at the beginning of the week)  Start weekly UA dopplers (see above)  Repeat fetal growth ultrasound in 3 weeks (scheduled through [facility])  Patient counseled re: fetal movement monitoring and decreased fetal movement  Monitor closely for any signs of evolving preeclampsia.  If  testing is non-reassuring, delivery may be warranted regardless of GA.  For all pregnancies with FGR, the placenta should be sent to pathology for examination.    General delivery timing: See Cholestasis section

## 2025-07-02 NOTE — ASSESSMENT & PLAN NOTE
Today we discussed the patients diagnosis of Intrahepatic cholestasis of pregnancy (ICP). We discussed the potential etiologies, risk factors, and maternal and  complications. Patients with preexisting hepatic disease, multiple gestations, advanced maternal age, and IVF pregnancies are at higher risk of developing ICP. Pregnancies affected by ICP are at risk for  birth, meconium stained fluid, respiratory distress, stillbirth as well as maternal hepatobiliary disease.  ICP also has a high rate of recurrence in subsequent pregnancies (up to 90%).  We discussed the limitations of antepartum testing with ICP.     Diagnosis  The diagnosis is made based on clinical symptoms and serum bile acid levels (pending) in the absence of other diseases that could cause similar symptoms and lab findings. Serum bile acid levels ideally should be collected after eight hours of fasting for optimal results. However, any level greater than 10 µmol/L is considered a positive result regardless of whether fasting or random.        Management  ICP is treated with urodeoxycholic acid(UCDA) with an initial dose of 500 mg TID with a maximum daily dose of 2000 mg. Alternatively, dosing can be initiated at 10-15 mg/kg/day and this dose can be divided into a BID or TID administration. UCDA has been shown to improve symptoms of pruritus and improve lab abnormalities however has not been shown to decrease the risk of adverse maternal or  outcomes. UCDA should be stopped postpartum. If symptoms persist, it is important to evaluate for underlying hepatic disease.  Twice weekly  fetal surveillance is recommended at 32 weeks however may be initiated earlier in consultation with M.    618- bile acids have returned at 121 (significantly elevated).  LFTs have normalized.  Next CMP will be in 1 week.  Overall she has tolerated the medication well but she is having some GI disturbance consistent with gallbladder disease  which could be an underlying cause of her cholestasis.  I recommend right upper quadrant ultrasound which we will schedule as an outpatient.  I asked her to follow a low-fat diet at this time.    7/2- repeat bile acids returned 183 last week.  Most recent result was down to 10 which is concerning for possible lab error.  Right upper quadrant ultrasound was done due to concern for biliary colic and showed gallstones as a possible underlying cause of her cholestasis.  Due to the elevated bile acid level she was started on cholestyramine as ursodiol was not preventing worsening of disease.  It is possible that she had choledocholithiasis that now has resolved on its own and bile acids are now improving.  She says the itching has been much better as well.  Overall, we will continue to follow LFTs and bile acids and continue to recommend delivery at 36 weeks with twice weekly antepartum testing.  We are planning for a course of steroids prior to delivery.     Recommendations:  CMP, BA weekly- ordered for next week   Continue ursodiol  Right upper quadrant ultrasound ordered  Delivery Recommendations  In patients with ICP and serum bile acids that are greater than or equal to 100 µmol/L at any point, we recommend delivery at 36 weeks gestation.  Delivery between 34 and 36 weeks gestation may be considered in patients with ICP and total bile acids greater than or equal to 100 µmol/L with any of the following:  Worsening, unremitting maternal pruritus unresolved with pharmacotherapy  History of stillbirth prior to 36 weeks gestation due to ICP  Preexisting or acute hepatic disease with evidence of worsening hepatic function

## 2025-07-02 NOTE — ASSESSMENT & PLAN NOTE
Oligohydramnios noted on previous ultrasound at OB office and in the hospital.  This resolved with IV fluid hydration.    Today on ultrasound amniotic fluid volume is normal with an SHEFALI of 9 cm, 2 x 2 pocket is present.    She will be getting twice weekly ultrasound and NST testing due to cholestasis in growth restriction.  We will monitor fluid levels.    7/2- normal SHEFALI

## 2025-07-07 ENCOUNTER — TELEPHONE (OUTPATIENT)
Dept: MATERNAL FETAL MEDICINE | Facility: CLINIC | Age: 25
End: 2025-07-07
Payer: COMMERCIAL

## 2025-07-08 ENCOUNTER — LAB VISIT (OUTPATIENT)
Dept: LAB | Facility: HOSPITAL | Age: 25
End: 2025-07-08
Attending: OBSTETRICS & GYNECOLOGY
Payer: COMMERCIAL

## 2025-07-08 DIAGNOSIS — O26.643 CHOLESTASIS DURING PREGNANCY IN THIRD TRIMESTER: ICD-10-CM

## 2025-07-08 LAB
ALBUMIN SERPL-MCNC: 2.9 G/DL (ref 3.5–5)
ALBUMIN/GLOB SERPL: 0.8 RATIO (ref 1.1–2)
ALP SERPL-CCNC: 106 UNIT/L (ref 40–150)
ALT SERPL-CCNC: 8 UNIT/L (ref 0–55)
ANION GAP SERPL CALC-SCNC: 7 MEQ/L
AST SERPL-CCNC: 10 UNIT/L (ref 11–45)
BILIRUB SERPL-MCNC: 0.4 MG/DL
BUN SERPL-MCNC: 6.4 MG/DL (ref 7–18.7)
CALCIUM SERPL-MCNC: 8.6 MG/DL (ref 8.4–10.2)
CHLORIDE SERPL-SCNC: 105 MMOL/L (ref 98–107)
CO2 SERPL-SCNC: 24 MMOL/L (ref 22–29)
CREAT SERPL-MCNC: 0.59 MG/DL (ref 0.55–1.02)
CREAT/UREA NIT SERPL: 11
GFR SERPLBLD CREATININE-BSD FMLA CKD-EPI: >60 ML/MIN/1.73/M2
GLOBULIN SER-MCNC: 3.8 GM/DL (ref 2.4–3.5)
GLUCOSE SERPL-MCNC: 118 MG/DL (ref 74–100)
POTASSIUM SERPL-SCNC: 3.4 MMOL/L (ref 3.5–5.1)
PROT SERPL-MCNC: 6.7 GM/DL (ref 6.4–8.3)
SODIUM SERPL-SCNC: 136 MMOL/L (ref 136–145)

## 2025-07-08 PROCEDURE — 80053 COMPREHEN METABOLIC PANEL: CPT

## 2025-07-08 PROCEDURE — 82239 BILE ACIDS TOTAL: CPT

## 2025-07-08 PROCEDURE — 36415 COLL VENOUS BLD VENIPUNCTURE: CPT

## 2025-07-09 LAB — BILE AC SERPL-SCNC: 13 MCMOL/L

## 2025-07-10 ENCOUNTER — PROCEDURE VISIT (OUTPATIENT)
Dept: MATERNAL FETAL MEDICINE | Facility: CLINIC | Age: 25
End: 2025-07-10
Payer: COMMERCIAL

## 2025-07-10 DIAGNOSIS — O26.643 CHOLESTASIS DURING PREGNANCY IN THIRD TRIMESTER: ICD-10-CM

## 2025-07-10 DIAGNOSIS — O26.643 CHOLESTASIS DURING PREGNANCY IN THIRD TRIMESTER: Primary | ICD-10-CM

## 2025-07-10 PROCEDURE — 76819 FETAL BIOPHYS PROFIL W/O NST: CPT | Mod: S$GLB,,, | Performed by: OBSTETRICS & GYNECOLOGY

## 2025-07-10 PROCEDURE — 76820 UMBILICAL ARTERY ECHO: CPT | Mod: S$GLB,,, | Performed by: OBSTETRICS & GYNECOLOGY

## 2025-07-15 DIAGNOSIS — O26.643 CHOLESTASIS DURING PREGNANCY IN THIRD TRIMESTER: Primary | ICD-10-CM

## 2025-07-16 ENCOUNTER — LAB VISIT (OUTPATIENT)
Dept: LAB | Facility: HOSPITAL | Age: 25
End: 2025-07-16
Attending: OBSTETRICS & GYNECOLOGY
Payer: COMMERCIAL

## 2025-07-16 DIAGNOSIS — O26.643 CHOLESTASIS DURING PREGNANCY IN THIRD TRIMESTER: ICD-10-CM

## 2025-07-16 LAB
ALBUMIN SERPL-MCNC: 2.9 G/DL (ref 3.5–5)
ALBUMIN/GLOB SERPL: 0.8 RATIO (ref 1.1–2)
ALP SERPL-CCNC: 93 UNIT/L (ref 40–150)
ALT SERPL-CCNC: 10 UNIT/L (ref 0–55)
ANION GAP SERPL CALC-SCNC: 9 MEQ/L
AST SERPL-CCNC: 11 UNIT/L (ref 11–45)
BILIRUB SERPL-MCNC: 0.4 MG/DL
BUN SERPL-MCNC: 6.9 MG/DL (ref 7–18.7)
CALCIUM SERPL-MCNC: 8.8 MG/DL (ref 8.4–10.2)
CHLORIDE SERPL-SCNC: 105 MMOL/L (ref 98–107)
CO2 SERPL-SCNC: 23 MMOL/L (ref 22–29)
CREAT SERPL-MCNC: 0.63 MG/DL (ref 0.55–1.02)
CREAT/UREA NIT SERPL: 11
GFR SERPLBLD CREATININE-BSD FMLA CKD-EPI: >60 ML/MIN/1.73/M2
GLOBULIN SER-MCNC: 3.8 GM/DL (ref 2.4–3.5)
GLUCOSE SERPL-MCNC: 100 MG/DL (ref 74–100)
POTASSIUM SERPL-SCNC: 3.5 MMOL/L (ref 3.5–5.1)
PROT SERPL-MCNC: 6.7 GM/DL (ref 6.4–8.3)
SODIUM SERPL-SCNC: 137 MMOL/L (ref 136–145)

## 2025-07-16 PROCEDURE — 80053 COMPREHEN METABOLIC PANEL: CPT

## 2025-07-16 PROCEDURE — 36415 COLL VENOUS BLD VENIPUNCTURE: CPT

## 2025-07-16 PROCEDURE — 82239 BILE ACIDS TOTAL: CPT

## 2025-07-17 ENCOUNTER — ANESTHESIA EVENT (OUTPATIENT)
Dept: OBSTETRICS AND GYNECOLOGY | Facility: HOSPITAL | Age: 25
End: 2025-07-17
Payer: COMMERCIAL

## 2025-07-17 ENCOUNTER — PROCEDURE VISIT (OUTPATIENT)
Dept: MATERNAL FETAL MEDICINE | Facility: CLINIC | Age: 25
End: 2025-07-17
Payer: COMMERCIAL

## 2025-07-17 VITALS — SYSTOLIC BLOOD PRESSURE: 126 MMHG | DIASTOLIC BLOOD PRESSURE: 79 MMHG | HEART RATE: 99 BPM

## 2025-07-17 DIAGNOSIS — O26.643 CHOLESTASIS DURING PREGNANCY IN THIRD TRIMESTER: ICD-10-CM

## 2025-07-17 DIAGNOSIS — O26.643 CHOLESTASIS DURING PREGNANCY IN THIRD TRIMESTER: Primary | ICD-10-CM

## 2025-07-17 LAB — BILE AC SERPL-SCNC: 13 MCMOL/L

## 2025-07-17 PROCEDURE — 76820 UMBILICAL ARTERY ECHO: CPT | Mod: S$GLB,,, | Performed by: OBSTETRICS & GYNECOLOGY

## 2025-07-17 PROCEDURE — 76819 FETAL BIOPHYS PROFIL W/O NST: CPT | Mod: S$GLB,,, | Performed by: OBSTETRICS & GYNECOLOGY

## 2025-07-18 ENCOUNTER — PATIENT MESSAGE (OUTPATIENT)
Dept: MATERNAL FETAL MEDICINE | Facility: CLINIC | Age: 25
End: 2025-07-18
Payer: COMMERCIAL

## 2025-07-21 ENCOUNTER — LAB VISIT (OUTPATIENT)
Dept: LAB | Facility: HOSPITAL | Age: 25
End: 2025-07-21
Attending: OBSTETRICS & GYNECOLOGY
Payer: COMMERCIAL

## 2025-07-21 DIAGNOSIS — O26.643 CHOLESTASIS DURING PREGNANCY IN THIRD TRIMESTER: ICD-10-CM

## 2025-07-21 LAB
ALBUMIN SERPL-MCNC: 3 G/DL (ref 3.5–5)
ALBUMIN/GLOB SERPL: 0.8 RATIO (ref 1.1–2)
ALP SERPL-CCNC: 94 UNIT/L (ref 40–150)
ALT SERPL-CCNC: 9 UNIT/L (ref 0–55)
ANION GAP SERPL CALC-SCNC: 11 MEQ/L
AST SERPL-CCNC: 8 UNIT/L (ref 11–45)
BILIRUB SERPL-MCNC: 0.4 MG/DL
BUN SERPL-MCNC: 9.1 MG/DL (ref 7–18.7)
CALCIUM SERPL-MCNC: 8.6 MG/DL (ref 8.4–10.2)
CHLORIDE SERPL-SCNC: 105 MMOL/L (ref 98–107)
CO2 SERPL-SCNC: 20 MMOL/L (ref 22–29)
CREAT SERPL-MCNC: 0.66 MG/DL (ref 0.55–1.02)
CREAT/UREA NIT SERPL: 14
GFR SERPLBLD CREATININE-BSD FMLA CKD-EPI: >60 ML/MIN/1.73/M2
GLOBULIN SER-MCNC: 3.6 GM/DL (ref 2.4–3.5)
GLUCOSE SERPL-MCNC: 174 MG/DL (ref 74–100)
POTASSIUM SERPL-SCNC: 3.6 MMOL/L (ref 3.5–5.1)
PROT SERPL-MCNC: 6.6 GM/DL (ref 6.4–8.3)
SODIUM SERPL-SCNC: 136 MMOL/L (ref 136–145)

## 2025-07-21 PROCEDURE — 36415 COLL VENOUS BLD VENIPUNCTURE: CPT

## 2025-07-21 PROCEDURE — 80053 COMPREHEN METABOLIC PANEL: CPT

## 2025-07-21 PROCEDURE — 82239 BILE ACIDS TOTAL: CPT

## 2025-07-22 LAB — BILE AC SERPL-SCNC: 3 MCMOL/L

## 2025-07-23 ENCOUNTER — PROCEDURE VISIT (OUTPATIENT)
Dept: MATERNAL FETAL MEDICINE | Facility: CLINIC | Age: 25
End: 2025-07-23
Payer: COMMERCIAL

## 2025-07-23 VITALS — DIASTOLIC BLOOD PRESSURE: 77 MMHG | SYSTOLIC BLOOD PRESSURE: 118 MMHG | HEART RATE: 104 BPM

## 2025-07-23 DIAGNOSIS — O26.643 CHOLESTASIS DURING PREGNANCY IN THIRD TRIMESTER: ICD-10-CM

## 2025-07-23 PROCEDURE — 76819 FETAL BIOPHYS PROFIL W/O NST: CPT | Mod: S$GLB,,, | Performed by: OBSTETRICS & GYNECOLOGY

## 2025-07-23 PROCEDURE — 76820 UMBILICAL ARTERY ECHO: CPT | Mod: S$GLB,,, | Performed by: OBSTETRICS & GYNECOLOGY

## 2025-07-25 ENCOUNTER — HOSPITAL ENCOUNTER (INPATIENT)
Facility: HOSPITAL | Age: 25
LOS: 3 days | Discharge: HOME | End: 2025-07-28
Attending: OBSTETRICS & GYNECOLOGY | Admitting: OBSTETRICS & GYNECOLOGY
Payer: COMMERCIAL

## 2025-07-25 ENCOUNTER — ANESTHESIA (OUTPATIENT)
Dept: OBSTETRICS AND GYNECOLOGY | Facility: HOSPITAL | Age: 25
End: 2025-07-25
Payer: COMMERCIAL

## 2025-07-25 DIAGNOSIS — R58 HEMORRHAGE: ICD-10-CM

## 2025-07-25 DIAGNOSIS — Z98.891 PREVIOUS CESAREAN SECTION: ICD-10-CM

## 2025-07-25 PROCEDURE — 25000003 PHARM REV CODE 250

## 2025-07-25 PROCEDURE — 25000003 PHARM REV CODE 250: Performed by: OBSTETRICS & GYNECOLOGY

## 2025-07-25 PROCEDURE — 63600175 PHARM REV CODE 636 W HCPCS: Performed by: OBSTETRICS & GYNECOLOGY

## 2025-07-25 PROCEDURE — 63600175 PHARM REV CODE 636 W HCPCS: Performed by: ANESTHESIOLOGY

## 2025-07-25 PROCEDURE — 99900035 HC TECH TIME PER 15 MIN (STAT)

## 2025-07-25 PROCEDURE — 37000008 HC ANESTHESIA 1ST 15 MINUTES: Performed by: OBSTETRICS & GYNECOLOGY

## 2025-07-25 PROCEDURE — 71000033 HC RECOVERY, INTIAL HOUR: Performed by: OBSTETRICS & GYNECOLOGY

## 2025-07-25 PROCEDURE — 37000009 HC ANESTHESIA EA ADD 15 MINS: Performed by: OBSTETRICS & GYNECOLOGY

## 2025-07-25 PROCEDURE — 63600175 PHARM REV CODE 636 W HCPCS: Mod: JZ,TB | Performed by: OBSTETRICS & GYNECOLOGY

## 2025-07-25 PROCEDURE — 62322 NJX INTERLAMINAR LMBR/SAC: CPT | Performed by: ANESTHESIOLOGY

## 2025-07-25 PROCEDURE — 36004724 HC OB OR TIME LEV III - 1ST 15 MIN: Performed by: OBSTETRICS & GYNECOLOGY

## 2025-07-25 PROCEDURE — 51702 INSERT TEMP BLADDER CATH: CPT

## 2025-07-25 PROCEDURE — 63600175 PHARM REV CODE 636 W HCPCS

## 2025-07-25 PROCEDURE — 36004725 HC OB OR TIME LEV III - EA ADD 15 MIN: Performed by: OBSTETRICS & GYNECOLOGY

## 2025-07-25 PROCEDURE — 27201423 OPTIME MED/SURG SUP & DEVICES STERILE SUPPLY: Performed by: OBSTETRICS & GYNECOLOGY

## 2025-07-25 PROCEDURE — 27000492 HC SLEEVE, SCD T/L

## 2025-07-25 PROCEDURE — 11000001 HC ACUTE MED/SURG PRIVATE ROOM

## 2025-07-25 RX ORDER — MISOPROSTOL 100 UG/1
800 TABLET ORAL
Status: DISCONTINUED | OUTPATIENT
Start: 2025-07-25 | End: 2025-07-28 | Stop reason: HOSPADM

## 2025-07-25 RX ORDER — PRENATAL WITH FERROUS FUM AND FOLIC ACID 3080; 920; 120; 400; 22; 1.84; 3; 20; 10; 1; 12; 200; 27; 25; 2 [IU]/1; [IU]/1; MG/1; [IU]/1; MG/1; MG/1; MG/1; MG/1; MG/1; MG/1; UG/1; MG/1; MG/1; MG/1; MG/1
1 TABLET ORAL DAILY
Status: DISCONTINUED | OUTPATIENT
Start: 2025-07-25 | End: 2025-07-28 | Stop reason: HOSPADM

## 2025-07-25 RX ORDER — DIPHENOXYLATE HYDROCHLORIDE AND ATROPINE SULFATE 2.5; .025 MG/1; MG/1
2 TABLET ORAL EVERY 6 HOURS PRN
Status: DISCONTINUED | OUTPATIENT
Start: 2025-07-25 | End: 2025-07-28 | Stop reason: HOSPADM

## 2025-07-25 RX ORDER — ACETAMINOPHEN 10 MG/ML
INJECTION, SOLUTION INTRAVENOUS
Status: DISCONTINUED | OUTPATIENT
Start: 2025-07-25 | End: 2025-07-25

## 2025-07-25 RX ORDER — IBUPROFEN 800 MG/1
800 TABLET, FILM COATED ORAL EVERY 8 HOURS
Status: DISCONTINUED | OUTPATIENT
Start: 2025-07-26 | End: 2025-07-28 | Stop reason: HOSPADM

## 2025-07-25 RX ORDER — OXYTOCIN-SODIUM CHLORIDE 0.9% IV SOLN 30 UNIT/500ML 30-0.9/5 UT/ML-%
95 SOLUTION INTRAVENOUS CONTINUOUS PRN
Status: DISCONTINUED | OUTPATIENT
Start: 2025-07-25 | End: 2025-07-25 | Stop reason: SDUPTHER

## 2025-07-25 RX ORDER — ONDANSETRON HYDROCHLORIDE 2 MG/ML
4 INJECTION, SOLUTION INTRAVENOUS DAILY PRN
Status: DISCONTINUED | OUTPATIENT
Start: 2025-07-25 | End: 2025-07-28 | Stop reason: HOSPADM

## 2025-07-25 RX ORDER — OXYTOCIN-SODIUM CHLORIDE 0.9% IV SOLN 30 UNIT/500ML 30-0.9/5 UT/ML-%
30 SOLUTION INTRAVENOUS ONCE AS NEEDED
Status: DISCONTINUED | OUTPATIENT
Start: 2025-07-25 | End: 2025-07-28 | Stop reason: HOSPADM

## 2025-07-25 RX ORDER — KETOROLAC TROMETHAMINE 30 MG/ML
30 INJECTION, SOLUTION INTRAMUSCULAR; INTRAVENOUS EVERY 8 HOURS
Status: COMPLETED | OUTPATIENT
Start: 2025-07-25 | End: 2025-07-26

## 2025-07-25 RX ORDER — CARBOPROST TROMETHAMINE 250 UG/ML
250 INJECTION, SOLUTION INTRAMUSCULAR
Status: DISCONTINUED | OUTPATIENT
Start: 2025-07-25 | End: 2025-07-28 | Stop reason: HOSPADM

## 2025-07-25 RX ORDER — DIPHENHYDRAMINE HCL 25 MG
25 CAPSULE ORAL EVERY 4 HOURS PRN
Status: DISCONTINUED | OUTPATIENT
Start: 2025-07-25 | End: 2025-07-28 | Stop reason: HOSPADM

## 2025-07-25 RX ORDER — SODIUM CHLORIDE, SODIUM GLUCONATE, SODIUM ACETATE, POTASSIUM CHLORIDE AND MAGNESIUM CHLORIDE 30; 37; 368; 526; 502 MG/100ML; MG/100ML; MG/100ML; MG/100ML; MG/100ML
INJECTION, SOLUTION INTRAVENOUS CONTINUOUS
OUTPATIENT
Start: 2025-07-25 | End: 2025-08-24

## 2025-07-25 RX ORDER — OXYTOCIN-SODIUM CHLORIDE 0.9% IV SOLN 30 UNIT/500ML 30-0.9/5 UT/ML-%
10 SOLUTION INTRAVENOUS ONCE AS NEEDED
Status: DISCONTINUED | OUTPATIENT
Start: 2025-07-25 | End: 2025-07-28 | Stop reason: HOSPADM

## 2025-07-25 RX ORDER — OXYTOCIN-SODIUM CHLORIDE 0.9% IV SOLN 30 UNIT/500ML 30-0.9/5 UT/ML-%
95 SOLUTION INTRAVENOUS CONTINUOUS PRN
Status: DISCONTINUED | OUTPATIENT
Start: 2025-07-25 | End: 2025-07-28 | Stop reason: HOSPADM

## 2025-07-25 RX ORDER — EPHEDRINE SULFATE 50 MG/ML
INJECTION, SOLUTION INTRAVENOUS
Status: DISCONTINUED | OUTPATIENT
Start: 2025-07-25 | End: 2025-07-25

## 2025-07-25 RX ORDER — ADHESIVE BANDAGE
30 BANDAGE TOPICAL 2 TIMES DAILY PRN
Status: DISCONTINUED | OUTPATIENT
Start: 2025-07-26 | End: 2025-07-28 | Stop reason: HOSPADM

## 2025-07-25 RX ORDER — ACETAMINOPHEN 10 MG/ML
1000 INJECTION, SOLUTION INTRAVENOUS ONCE
Status: ACTIVE | OUTPATIENT
Start: 2025-07-25 | End: 2025-07-26

## 2025-07-25 RX ORDER — FAMOTIDINE 10 MG/ML
20 INJECTION, SOLUTION INTRAVENOUS
Status: DISCONTINUED | OUTPATIENT
Start: 2025-07-25 | End: 2025-07-28 | Stop reason: HOSPADM

## 2025-07-25 RX ORDER — ONDANSETRON 4 MG/1
8 TABLET, ORALLY DISINTEGRATING ORAL EVERY 8 HOURS PRN
Status: DISCONTINUED | OUTPATIENT
Start: 2025-07-25 | End: 2025-07-28 | Stop reason: HOSPADM

## 2025-07-25 RX ORDER — METHYLERGONOVINE MALEATE 0.2 MG/ML
200 INJECTION INTRAVENOUS ONCE AS NEEDED
Status: DISCONTINUED | OUTPATIENT
Start: 2025-07-25 | End: 2025-07-28 | Stop reason: HOSPADM

## 2025-07-25 RX ORDER — AMOXICILLIN 250 MG
1 CAPSULE ORAL NIGHTLY PRN
Status: DISCONTINUED | OUTPATIENT
Start: 2025-07-25 | End: 2025-07-28 | Stop reason: HOSPADM

## 2025-07-25 RX ORDER — KETOROLAC TROMETHAMINE 30 MG/ML
INJECTION, SOLUTION INTRAMUSCULAR; INTRAVENOUS
Status: DISCONTINUED | OUTPATIENT
Start: 2025-07-25 | End: 2025-07-25

## 2025-07-25 RX ORDER — OXYTOCIN-SODIUM CHLORIDE 0.9% IV SOLN 30 UNIT/500ML 30-0.9/5 UT/ML-%
95 SOLUTION INTRAVENOUS ONCE AS NEEDED
Status: DISCONTINUED | OUTPATIENT
Start: 2025-07-25 | End: 2025-07-28 | Stop reason: HOSPADM

## 2025-07-25 RX ORDER — CEFAZOLIN SODIUM 1 G/3ML
2 INJECTION, POWDER, FOR SOLUTION INTRAMUSCULAR; INTRAVENOUS
Status: DISCONTINUED | OUTPATIENT
Start: 2025-07-25 | End: 2025-07-28 | Stop reason: HOSPADM

## 2025-07-25 RX ORDER — SODIUM CITRATE AND CITRIC ACID MONOHYDRATE 334; 500 MG/5ML; MG/5ML
30 SOLUTION ORAL
Status: DISCONTINUED | OUTPATIENT
Start: 2025-07-25 | End: 2025-07-28 | Stop reason: HOSPADM

## 2025-07-25 RX ORDER — DIPHENHYDRAMINE HYDROCHLORIDE 50 MG/ML
25 INJECTION, SOLUTION INTRAMUSCULAR; INTRAVENOUS EVERY 6 HOURS PRN
Status: DISCONTINUED | OUTPATIENT
Start: 2025-07-25 | End: 2025-07-28 | Stop reason: HOSPADM

## 2025-07-25 RX ORDER — DOCUSATE SODIUM 100 MG/1
200 CAPSULE, LIQUID FILLED ORAL 2 TIMES DAILY
Status: DISCONTINUED | OUTPATIENT
Start: 2025-07-25 | End: 2025-07-28 | Stop reason: HOSPADM

## 2025-07-25 RX ORDER — SODIUM CHLORIDE, SODIUM LACTATE, POTASSIUM CHLORIDE, CALCIUM CHLORIDE 600; 310; 30; 20 MG/100ML; MG/100ML; MG/100ML; MG/100ML
INJECTION, SOLUTION INTRAVENOUS CONTINUOUS
Status: DISCONTINUED | OUTPATIENT
Start: 2025-07-25 | End: 2025-07-26

## 2025-07-25 RX ORDER — OXYTOCIN 10 [USP'U]/ML
10 INJECTION, SOLUTION INTRAMUSCULAR; INTRAVENOUS ONCE AS NEEDED
Status: DISCONTINUED | OUTPATIENT
Start: 2025-07-25 | End: 2025-07-28 | Stop reason: HOSPADM

## 2025-07-25 RX ORDER — MUPIROCIN 20 MG/G
OINTMENT TOPICAL
Status: DISCONTINUED | OUTPATIENT
Start: 2025-07-25 | End: 2025-07-28 | Stop reason: HOSPADM

## 2025-07-25 RX ORDER — OXYCODONE AND ACETAMINOPHEN 5; 325 MG/1; MG/1
1 TABLET ORAL EVERY 4 HOURS PRN
Status: DISCONTINUED | OUTPATIENT
Start: 2025-07-25 | End: 2025-07-28 | Stop reason: HOSPADM

## 2025-07-25 RX ORDER — PHENYLEPHRINE HYDROCHLORIDE 10 MG/ML
INJECTION INTRAVENOUS
Status: DISCONTINUED | OUTPATIENT
Start: 2025-07-25 | End: 2025-07-25

## 2025-07-25 RX ORDER — GLUCAGON 1 MG
1 KIT INJECTION
Status: DISCONTINUED | OUTPATIENT
Start: 2025-07-25 | End: 2025-07-28 | Stop reason: HOSPADM

## 2025-07-25 RX ORDER — BISACODYL 10 MG/1
10 SUPPOSITORY RECTAL ONCE AS NEEDED
Status: DISCONTINUED | OUTPATIENT
Start: 2025-07-25 | End: 2025-07-28 | Stop reason: HOSPADM

## 2025-07-25 RX ORDER — FENTANYL CITRATE 50 UG/ML
INJECTION, SOLUTION INTRAMUSCULAR; INTRAVENOUS
Status: DISCONTINUED | OUTPATIENT
Start: 2025-07-25 | End: 2025-07-25

## 2025-07-25 RX ORDER — NALBUPHINE HYDROCHLORIDE 10 MG/ML
10 INJECTION INTRAMUSCULAR; INTRAVENOUS; SUBCUTANEOUS
Status: DISCONTINUED | OUTPATIENT
Start: 2025-07-25 | End: 2025-07-28 | Stop reason: HOSPADM

## 2025-07-25 RX ORDER — MORPHINE SULFATE 1 MG/ML
INJECTION, SOLUTION EPIDURAL; INTRATHECAL; INTRAVENOUS
Status: DISCONTINUED | OUTPATIENT
Start: 2025-07-25 | End: 2025-07-25

## 2025-07-25 RX ORDER — HYDROMORPHONE HYDROCHLORIDE 2 MG/ML
1 INJECTION, SOLUTION INTRAMUSCULAR; INTRAVENOUS; SUBCUTANEOUS EVERY 4 HOURS PRN
Status: DISCONTINUED | OUTPATIENT
Start: 2025-07-25 | End: 2025-07-28 | Stop reason: HOSPADM

## 2025-07-25 RX ORDER — METHYLERGONOVINE MALEATE 0.2 MG/ML
200 INJECTION INTRAVENOUS
Status: DISCONTINUED | OUTPATIENT
Start: 2025-07-25 | End: 2025-07-28 | Stop reason: HOSPADM

## 2025-07-25 RX ORDER — MISOPROSTOL 100 UG/1
800 TABLET ORAL ONCE AS NEEDED
Status: DISCONTINUED | OUTPATIENT
Start: 2025-07-25 | End: 2025-07-28 | Stop reason: HOSPADM

## 2025-07-25 RX ORDER — LIDOCAINE HYDROCHLORIDE 10 MG/ML
1 INJECTION, SOLUTION EPIDURAL; INFILTRATION; INTRACAUDAL; PERINEURAL ONCE
OUTPATIENT
Start: 2025-07-25 | End: 2025-07-25

## 2025-07-25 RX ORDER — SIMETHICONE 80 MG
1 TABLET,CHEWABLE ORAL EVERY 6 HOURS PRN
Status: DISCONTINUED | OUTPATIENT
Start: 2025-07-25 | End: 2025-07-28 | Stop reason: HOSPADM

## 2025-07-25 RX ORDER — ONDANSETRON HYDROCHLORIDE 2 MG/ML
INJECTION, SOLUTION INTRAVENOUS
Status: DISCONTINUED | OUTPATIENT
Start: 2025-07-25 | End: 2025-07-25

## 2025-07-25 RX ORDER — OXYCODONE AND ACETAMINOPHEN 10; 325 MG/1; MG/1
1 TABLET ORAL EVERY 4 HOURS PRN
Status: DISCONTINUED | OUTPATIENT
Start: 2025-07-25 | End: 2025-07-28 | Stop reason: HOSPADM

## 2025-07-25 RX ORDER — BUPIVACAINE HYDROCHLORIDE 7.5 MG/ML
INJECTION, SOLUTION EPIDURAL; RETROBULBAR
Status: COMPLETED | OUTPATIENT
Start: 2025-07-25 | End: 2025-07-25

## 2025-07-25 RX ADMIN — NALBUPHINE HYDROCHLORIDE 10 MG: 10 INJECTION, SOLUTION INTRAMUSCULAR; INTRAVENOUS; SUBCUTANEOUS at 10:07

## 2025-07-25 RX ADMIN — SODIUM CHLORIDE, POTASSIUM CHLORIDE, SODIUM LACTATE AND CALCIUM CHLORIDE 1000 ML: 600; 310; 30; 20 INJECTION, SOLUTION INTRAVENOUS at 08:07

## 2025-07-25 RX ADMIN — KETOROLAC TROMETHAMINE 30 MG: 30 INJECTION, SOLUTION INTRAMUSCULAR at 05:07

## 2025-07-25 RX ADMIN — PHENYLEPHRINE HYDROCHLORIDE 100 MCG: 10 INJECTION INTRAVENOUS at 10:07

## 2025-07-25 RX ADMIN — PHENYLEPHRINE HYDROCHLORIDE 100 MCG: 10 INJECTION INTRAVENOUS at 09:07

## 2025-07-25 RX ADMIN — DOCUSATE SODIUM 200 MG: 100 CAPSULE, LIQUID FILLED ORAL at 08:07

## 2025-07-25 RX ADMIN — ONDANSETRON 8 MG: 2 INJECTION INTRAMUSCULAR; INTRAVENOUS at 09:07

## 2025-07-25 RX ADMIN — Medication 95 ML/HR: at 10:07

## 2025-07-25 RX ADMIN — DEXTROSE MONOHYDRATE 2 G: 50 INJECTION, SOLUTION INTRAVENOUS at 09:07

## 2025-07-25 RX ADMIN — EPHEDRINE SULFATE 25 MG: 50 INJECTION INTRAVENOUS at 10:07

## 2025-07-25 RX ADMIN — ACETAMINOPHEN 1000 MG: 10 INJECTION, SOLUTION INTRAVENOUS at 09:07

## 2025-07-25 RX ADMIN — Medication 95 MILLI-UNITS/MIN: at 10:07

## 2025-07-25 RX ADMIN — FENTANYL CITRATE 10 MCG: 50 INJECTION, SOLUTION INTRAMUSCULAR; INTRAVENOUS at 09:07

## 2025-07-25 RX ADMIN — OXYCODONE HYDROCHLORIDE AND ACETAMINOPHEN 1 TABLET: 5; 325 TABLET ORAL at 02:07

## 2025-07-25 RX ADMIN — Medication 334 ML/HR: at 09:07

## 2025-07-25 RX ADMIN — MORPHINE SULFATE 0.1 MG: 1 INJECTION, SOLUTION EPIDURAL; INTRATHECAL; INTRAVENOUS at 09:07

## 2025-07-25 RX ADMIN — SODIUM CHLORIDE, POTASSIUM CHLORIDE, SODIUM LACTATE AND CALCIUM CHLORIDE: 600; 310; 30; 20 INJECTION, SOLUTION INTRAVENOUS at 09:07

## 2025-07-25 RX ADMIN — SODIUM CITRATE AND CITRIC ACID MONOHYDRATE 30 ML: 500; 334 SOLUTION ORAL at 08:07

## 2025-07-25 RX ADMIN — BUPIVACAINE HYDROCHLORIDE 1.8 ML: 7.5 INJECTION, SOLUTION EPIDURAL; RETROBULBAR at 09:07

## 2025-07-25 RX ADMIN — KETOROLAC TROMETHAMINE 30 MG: 30 INJECTION, SOLUTION INTRAMUSCULAR; INTRAVENOUS at 09:07

## 2025-07-25 NOTE — H&P
"   HISTORY AND PHYSICAL                                                OBSTETRICS          Subjective:      Beth Mccoy is a 25 y.o.  female with IUP at 36w3d weeks gestation who presents to L&D for pCS for ICP and breech presentation. Pertinent medical history for this pregnancy includes ICP, breech presentation, obesity, oligohydramnios (resolved), FGR.  Care this pregnancy has been with Dr. Rodriguez.    PMHx:   Past Medical History:   Diagnosis Date    Cholestasis during pregnancy     Oligohydramnios        PSHx:   Past Surgical History:   Procedure Laterality Date    TONSILLECTOMY AND ADENOIDECTOMY         All: Review of patient's allergies indicates:  No Known Allergies    Meds: Prescriptions Prior to Admission[1]    SH: Social History[2]    FH:   Family History   Problem Relation Name Age of Onset    Diabetes Mother Mother     Arthritis Mother Mother     Diabetes Father Father and his siblings     Heart disease Maternal Grandfather Grandmother     Cancer Maternal Grandmother Grandmother        OBHx:   OB History    Para Term  AB Living   1 0 0 0 0 0   SAB IAB Ectopic Multiple Live Births   0 0 0 0 0      # Outcome Date GA Lbr Herb/2nd Weight Sex Type Anes PTL Lv   1 Current                Objective:      /83   Pulse 108   Temp 98.7 °F (37.1 °C) (Oral)   Resp 18   Ht 5' 7" (1.702 m)   Wt 111.6 kg (246 lb)   LMP 2024 (Exact Date)   Breastfeeding No   BMI 38.53 kg/m²   Body mass index is 38.53 kg/m².    General:   alert and cooperative   HEENT:  normocephalic, atraumatic   Lungs:   No increased WOB   CV Well-perfused   Abdomen:  gravid, non-tender   Extremities non-tender, no edema   Derm: no rashes or lesions   Psych: appropriate mood and affect   Pelvis:  adequate       FHT: Category: 1                 TOCO: Contractions: regular, every 5 minutes            Assessment:     25 y.o.  at 36w3d weeks gestation.  ICP, bile acids reaching 183  Breech presentation, " confirmed in pre op today  FGR    There are no hospital problems to display for this patient.         Plan:     1. Risks, benefits, alternatives and possible complications have been discussed in detail with the patient. All questions have been answered, and Ms. Mccoy has voiced understanding and agrees to the treatment plan.  2. Consents signed and in chart  3. Admit to Labor and Delivery unit                [1]   Medications Prior to Admission   Medication Sig Dispense Refill Last Dose/Taking    cholestyramine (QUESTRAN) 4 gram packet Take 1 packet (4 g total) by mouth 2 (two) times daily. 180 packet 3     PNV-DHA 27 mg iron-1 mg -300 mg Cap Take 1 capsule by mouth.       triamcinolone acetonide 0.1% (KENALOG) 0.1 % cream Apply topically 2 (two) times daily. 45 g 6     ursodioL (ACTIGALL) 250 mg Tab Take 2 tablets (500 mg total) by mouth 3 (three) times daily with meals. 180 tablet 3    [2]   Social History  Socioeconomic History    Marital status: Single   Tobacco Use    Smoking status: Never    Smokeless tobacco: Never   Vaping Use    Vaping status: Never Used   Substance and Sexual Activity    Alcohol use: Not Currently     Comment: ocassionally    Drug use: Never    Sexual activity: Yes     Partners: Male     Social Drivers of Health     Financial Resource Strain: Low Risk  (6/9/2025)    Overall Financial Resource Strain (CARDIA)     Difficulty of Paying Living Expenses: Not hard at all   Food Insecurity: No Food Insecurity (6/9/2025)    Hunger Vital Sign     Worried About Running Out of Food in the Last Year: Never true     Ran Out of Food in the Last Year: Never true   Transportation Needs: No Transportation Needs (6/9/2025)    PRAPARE - Transportation     Lack of Transportation (Medical): No     Lack of Transportation (Non-Medical): No   Physical Activity: Insufficiently Active (12/12/2024)    Exercise Vital Sign     Days of Exercise per Week: 5 days     Minutes of Exercise per Session: 20 min   Stress:  No Stress Concern Present (6/9/2025)    [employer] - Occupational Stress Questionnaire     Feeling of Stress : Not at all   Housing Stability: Low Risk  (6/9/2025)    Housing Stability Vital Sign     Unable to Pay for Housing in the Last Year: No     Homeless in the Last Year: No

## 2025-07-25 NOTE — L&D DELIVERY NOTE
Primary Low Transverse  Delivery Operative Report    Pre-op Diagnosis:   1.  Intrauterine Pregnancy at 36+3 WGA  2.  Breech presentation  3. ICP  4. FGR    Postop Diagnosis: Same  Procedure: Primary Low Transverse  Section via Pfannenstiel incision  Surgeon: Jevon Rodriguez MD  Assistant: MD Juan  Anesthesia: Spinal  Complications: None  QBL: per RN  Findings: Normal uterus, tubes and ovaries.  Viable female infant with Apgars and weight per NICU  Specimen: Placenta to path    Indication and Consent: Patient presented to L&D scheduled  delivery. I discussed risks, benefits and options with her in detail. The patient understood that the risks of  section include, but are not limited to, visceral or vascular injury, infection, blood loss and the need for transfusion, prolonged hospitalization and reoperation.  The patient stated understanding and desired to proceed.  All questions were answered.     Procedure: She was taken to the operating room where spinal anesthesia was administered.  Ancef was given for infection prophylaxis.  She was prepped and draped in the dorsal supine position with a leftward tilt.  Anesthesia was found to be adequate. A Pfannenstiel skin incision was made with the scalpel and carried down to the fascia with the scalpel  The fascia was incised and extended laterally bluntly. The rectus muscle was  in the midline. Preperitoneal fatty tissue was bluntly dissected to expose the peritoneum.  The peritoneum was found to be free of adherent bowel and entered bluntly.  The peritoneal incision was extended superiorly and inferiorly to the bladder reflection with good visualization of the bladder.  The Gorge retractor was placed. The vesicouterine peritoneum identified, then opened with scissors and the bladder flap was developed.   The lower uterine segment was incised with a scalpel.  The amniotic sac was ruptured and clear fluid was noted.  The  uterine incision was extended bluntly with lateral and upward traction.  The fetus was in carlton breech presentation. The infant delivered to the level of axilla.  Each arm was delivered without difficulty, followed by the head. The mouth and nose were suctioned with bulb suction.  The cord was clamped and cut.  The infant was handed off to waiting nursing and respiratory personnel.  IV Pitocin was initiated to facilitate uterine contractions.  The placenta was delivered intact with manual massage of the uterine fundus.  The inside of the uterus was gently wiped with a lap sponge to assure complete removal of placental membranes.  The uterine incision was closed with a 0 Vicryl suture in a running locked fashion. This was followed by an imbricating stitch with 0-Monocryl.     Blood clots and fluid were wiped out of the abdomen and pelvis with moist lap sponges.  The uterine incision was reinspected and good hemostasis was noted.   The Gorge retractor was removed. The fascia was closed with 0 Stratafix in a continuous running fashion.  The subcuticular tissue was irrigated with warm normal saline.  Subcuticular tissue was reapproximated with 2-0 Monocryl.   Skin was closed using 4-0 Stratafix in a subcuticular fashion.  The patient tolerated the procedure well.  All sponge and lap counts were correct times 2.  The patient was taken to the recovery room in stable condition.

## 2025-07-25 NOTE — TRANSFER OF CARE
"Anesthesia Transfer of Care Note    Patient: Beth Mccoy    Procedure(s) Performed: Procedure(s) (LRB):   SECTION (N/A)    Patient location: Labor and Delivery    Anesthesia Type: spinal    Transport from OR: Transported from OR on room air with adequate spontaneous ventilation    Post pain: adequate analgesia    Post assessment: no apparent anesthetic complications    Post vital signs: stable    Level of consciousness: awake, alert and oriented    Nausea/Vomiting: no nausea/vomiting    Complications: none    Transfer of care protocol was followed    Last vitals: Visit Vitals  /77   Pulse 99   Temp 37.1 °C (98.7 °F) (Oral)   Resp 18   Ht 5' 7" (1.702 m)   Wt 111.6 kg (246 lb)   LMP 2024 (Exact Date)   Breastfeeding No   BMI 38.53 kg/m²     "

## 2025-07-25 NOTE — ANESTHESIA PREPROCEDURE EVALUATION
"                                                                                                             2025  Beth Mccoy is a 25 y.o., female  with IUP at 36 weeks, poor fetal growth, oligohydramnios, and cholestasis during pregnancy.  She is here today for scheduled primary C section.  She denies cardiopulmonary complaints.    "Ms. Beth Mccoy is a 25 y.o.  female with IUP at 33w1d who is seen in consultation by M for evaluation and management of:  Problem   Poor Fetal Growth Affecting Management of Mother in Third Trimester   Oligohydramnios Antepartum   Cholestasis During Pregnancy in Third Trimester         She is feeling well and has no current complaints.  She started taking the cholestyramine and has not had any GI disturbance.  She said she has had a good week in her itching is improved.      ...    ASSESSMENT/PLAN:      25 y.o.  female with IUP at 33w1d      Assessment & Plan  Cholestasis during pregnancy in third trimester  Today we discussed the patients diagnosis of Intrahepatic cholestasis of pregnancy (ICP). We discussed the potential etiologies, risk factors, and maternal and  complications. Patients with preexisting hepatic disease, multiple gestations, advanced maternal age, and IVF pregnancies are at higher risk of developing ICP. Pregnancies affected by ICP are at risk for  birth, meconium stained fluid, respiratory distress, stillbirth as well as maternal hepatobiliary disease.  ICP also has a high rate of recurrence in subsequent pregnancies (up to 90%).  We discussed the limitations of antepartum testing with ICP.     Diagnosis  The diagnosis is made based on clinical symptoms and serum bile acid levels (pending) in the absence of other diseases that could cause similar symptoms and lab findings. Serum bile acid levels ideally should be collected after eight hours of fasting for optimal results. However, any level greater than 10 µmol/L is " considered a positive result regardless of whether fasting or random.         Management  ICP is treated with urodeoxycholic acid(UCDA) with an initial dose of 500 mg TID with a maximum daily dose of 2000 mg. Alternatively, dosing can be initiated at 10-15 mg/kg/day and this dose can be divided into a BID or TID administration. UCDA has been shown to improve symptoms of pruritus and improve lab abnormalities however has not been shown to decrease the risk of adverse maternal or  outcomes. UCDA should be stopped postpartum. If symptoms persist, it is important to evaluate for underlying hepatic disease.  Twice weekly  fetal surveillance is recommended at 32 weeks however may be initiated earlier in consultation with MFM.     - bile acids have returned at 121 (significantly elevated).  LFTs have normalized.  Next CMP will be in 1 week.  Overall she has tolerated the medication well but she is having some GI disturbance consistent with gallbladder disease which could be an underlying cause of her cholestasis.  I recommend right upper quadrant ultrasound which we will schedule as an outpatient.  I asked her to follow a low-fat diet at this time.     - repeat bile acids returned 183 last week.  Most recent result was down to 10 which is concerning for possible lab error.  Right upper quadrant ultrasound was done due to concern for biliary colic and showed gallstones as a possible underlying cause of her cholestasis.  Due to the elevated bile acid level she was started on cholestyramine as ursodiol was not preventing worsening of disease.  It is possible that she had choledocholithiasis that now has resolved on its own and bile acids are now improving.  She says the itching has been much better as well.  Overall, we will continue to follow LFTs and bile acids and continue to recommend delivery at 36 weeks with twice weekly antepartum testing.  We are planning for a course of steroids prior to  delivery.     Recommendations:  CMP, BA weekly- ordered for next week   Continue ursodiol  Right upper quadrant ultrasound ordered  Delivery Recommendations  In patients with ICP and serum bile acids that are greater than or equal to 100 µmol/L at any point, we recommend delivery at 36 weeks gestation.  Delivery between 34 and 36 weeks gestation may be considered in patients with ICP and total bile acids greater than or equal to 100 µmol/L with any of the following:  Worsening, unremitting maternal pruritus unresolved with pharmacotherapy  History of stillbirth prior to 36 weeks gestation due to ICP  Preexisting or acute hepatic disease with evidence of worsening hepatic function        Oligohydramnios, antepartum, single or unspecified fetus  Oligohydramnios noted on previous ultrasound at OB office and in the hospital.  This resolved with IV fluid hydration.     Today on ultrasound amniotic fluid volume is normal with an SHEFALI of 9 cm, 2 x 2 pocket is present.     She will be getting twice weekly ultrasound and NST testing due to cholestasis in growth restriction.  We will monitor fluid levels.     7/2- normal SHEFALI  Poor fetal growth affecting management of mother in third trimester, single or unspecified fetus  FGR: The patients fetus has been diagnosed with FGR based on EFW < 10th percentile/AC < 10th percentile. Potential etiologies of FGR include but are not limited to normal variation of stature, placental insufficiency, chromosomal abnormalities, genetic disorders, infections, medical conditions, teratogen exposure and other etiologies. Pregnancies complicated by FGR are at increased risk of stillbirth. We discussed delivery timing and recommendations for antepartum testing.      6/12/25 : EFW 16%, AC 2%. BPP 8/8. AFV normal. Umbilical artery dopplers demonstrate diastolic flow with normal S/D ratio.   7/2/25- EFW 13%, AC 7%. BPP 8/8. AFV normal. Umbilical artery dopplers demonstrate diastolic flow with normal  "S/D ratio.      Recommendations:  Start twice weekly  fetal surveillance with NST and BPP until delivery (BPP/UAD/AFV at M office at the end of the week; NST at primary OB at the beginning of the week)  Start weekly UA dopplers (see above)  Repeat fetal growth ultrasound in 3 weeks (scheduled through [facility])  Patient counseled re: fetal movement monitoring and decreased fetal movement  Monitor closely for any signs of evolving preeclampsia.  If  testing is non-reassuring, delivery may be warranted regardless of GA.  For all pregnancies with FGR, the placenta should be sent to pathology for examination."         Pre-op Assessment    I have reviewed the Patient Summary Reports.     I have reviewed the Nursing Notes. I have reviewed the NPO Status.   I have reviewed the Medications.     Review of Systems  Anesthesia Hx:  No problems with previous Anesthesia             Denies Family Hx of Anesthesia complications.    Denies Personal Hx of Anesthesia complications.                    Cardiovascular:  Cardiovascular Normal Exercise tolerance: good          Denies Angina.                                    Pulmonary:  Pulmonary Normal      Denies Shortness of breath.                  Hepatic/GI:      Liver Disease,               Endocrine:        Obesity / BMI > 30      Physical Exam  General: Well nourished, Cooperative, Alert and Oriented    Airway:  Mallampati: II   Mouth Opening: Normal  TM Distance: Normal  Tongue: Normal  Neck ROM: Normal ROM    Dental:  Intact    Chest/Lungs:  Normal Respiratory Rate    Heart:  Rate: Normal  Rhythm: Regular Rhythm        Anesthesia Plan  Type of Anesthesia, risks & benefits discussed:    Anesthesia Type: Spinal  Intra-op Monitoring Plan: Standard ASA Monitors  Post Op Pain Control Plan: multimodal analgesia  Informed Consent: Informed consent signed with the Patient and all parties understand the risks and agree with anesthesia plan.  All questions answered.   ASA " Score: 2  Day of Surgery Review of History & Physical: H&P Update referred to the surgeon/provider.    Ready For Surgery From Anesthesia Perspective.     .

## 2025-07-25 NOTE — ANESTHESIA PROCEDURE NOTES
Spinal    Diagnosis: Intrauterine pregnancy  Patient location during procedure: OB  Start time: 7/25/2025 9:28 AM  Timeout: 7/25/2025 9:26 AM  End time: 7/25/2025 9:31 AM    Staffing  Authorizing Provider: Kirill Baires MD  Performing Provider: Kirill Baires MD    Staffing  Performed by: Kirill Baires MD  Authorized by: Kirill Baires MD    Preanesthetic Checklist  Completed: patient identified, IV checked, site marked, risks and benefits discussed, surgical consent, monitors and equipment checked, pre-op evaluation and timeout performed  Spinal Block  Patient position: sitting  Prep: ChloraPrep  Patient monitoring: heart rate, cardiac monitor, frequent blood pressure checks and continuous pulse ox  Approach: midline  Location: L3-4  Injection technique: single shot  CSF Fluid: clear free-flowing CSF  Needle  Needle type: pencil-tip   Needle gauge: 25 G  Needle length: 3.5 in  Additional Documentation: incremental injection, negative aspiration for heme and no paresthesia on injection  Needle localization: anatomical landmarks  Assessment  Ease of block: easy  Patient's tolerance of the procedure: comfortable throughout block and no complaints  Medications:    Medications: bupivacaine (pf) (MARCAINE) injection 0.75% - Intraspinal   1.8 mL - 7/25/2025 9:31:00 AM

## 2025-07-26 LAB
BASOPHILS # BLD AUTO: 0.04 X10(3)/MCL
BASOPHILS NFR BLD AUTO: 0.3 %
EOSINOPHIL # BLD AUTO: 0.03 X10(3)/MCL (ref 0–0.9)
EOSINOPHIL NFR BLD AUTO: 0.2 %
ERYTHROCYTE [DISTWIDTH] IN BLOOD BY AUTOMATED COUNT: 12.8 % (ref 11.5–17)
HCT VFR BLD AUTO: 30.1 % (ref 37–47)
HGB BLD-MCNC: 9.6 G/DL (ref 12–16)
IMM GRANULOCYTES # BLD AUTO: 0.06 X10(3)/MCL (ref 0–0.04)
IMM GRANULOCYTES NFR BLD AUTO: 0.4 %
LYMPHOCYTES # BLD AUTO: 2.78 X10(3)/MCL (ref 0.6–4.6)
LYMPHOCYTES NFR BLD AUTO: 20.8 %
MCH RBC QN AUTO: 29 PG (ref 27–31)
MCHC RBC AUTO-ENTMCNC: 31.9 G/DL (ref 33–36)
MCV RBC AUTO: 90.9 FL (ref 80–94)
MONOCYTES # BLD AUTO: 0.89 X10(3)/MCL (ref 0.1–1.3)
MONOCYTES NFR BLD AUTO: 6.7 %
NEUTROPHILS # BLD AUTO: 9.58 X10(3)/MCL (ref 2.1–9.2)
NEUTROPHILS NFR BLD AUTO: 71.6 %
NRBC BLD AUTO-RTO: 0 %
PLATELET # BLD AUTO: 246 X10(3)/MCL (ref 130–400)
PMV BLD AUTO: 11.4 FL (ref 7.4–10.4)
RBC # BLD AUTO: 3.31 X10(6)/MCL (ref 4.2–5.4)
WBC # BLD AUTO: 13.38 X10(3)/MCL (ref 4.5–11.5)

## 2025-07-26 PROCEDURE — 63600175 PHARM REV CODE 636 W HCPCS: Mod: JZ,TB | Performed by: OBSTETRICS & GYNECOLOGY

## 2025-07-26 PROCEDURE — 25000003 PHARM REV CODE 250: Performed by: OBSTETRICS & GYNECOLOGY

## 2025-07-26 PROCEDURE — 11000001 HC ACUTE MED/SURG PRIVATE ROOM

## 2025-07-26 PROCEDURE — 36415 COLL VENOUS BLD VENIPUNCTURE: CPT | Performed by: OBSTETRICS & GYNECOLOGY

## 2025-07-26 PROCEDURE — 85025 COMPLETE CBC W/AUTO DIFF WBC: CPT | Performed by: OBSTETRICS & GYNECOLOGY

## 2025-07-26 RX ADMIN — KETOROLAC TROMETHAMINE 30 MG: 30 INJECTION, SOLUTION INTRAMUSCULAR at 01:07

## 2025-07-26 RX ADMIN — KETOROLAC TROMETHAMINE 30 MG: 30 INJECTION, SOLUTION INTRAMUSCULAR at 10:07

## 2025-07-26 RX ADMIN — OXYCODONE AND ACETAMINOPHEN 1 TABLET: 325; 10 TABLET ORAL at 12:07

## 2025-07-26 RX ADMIN — IBUPROFEN 800 MG: 800 TABLET, FILM COATED ORAL at 05:07

## 2025-07-26 RX ADMIN — DOCUSATE SODIUM 200 MG: 100 CAPSULE, LIQUID FILLED ORAL at 08:07

## 2025-07-26 NOTE — PROGRESS NOTES
PostPartum Progress Note        Subjective:      Post-Partum Day #1 after  delivery secondary to breech presentation and ICP.    Patient is without complaints. Lochia less than menses. Breast feeding. Pain is well controlled. Patient is ambulating. Tolerating Full Regular diet.Overall mother and baby are doing well.     Objective:      Temp:  [97.1 °F (36.2 °C)-98.5 °F (36.9 °C)] 98.1 °F (36.7 °C)  Pulse:  [] 70  Resp:  [15-21] 17  SpO2:  [97 %-100 %] 98 %  BP: (107-148)/() 107/73    Intake/Output Summary (Last 24 hours) at 2025 09  Last data filed at 2025  Gross per 24 hour   Intake 1050 ml   Output 1855 ml   Net -805 ml     Body mass index is 38.53 kg/m².    General: no acute distress  Abdomen: soft, non-tender, non-distended; Fundus firm and below the umbilicus  Incision c/d/i  Extremities: non-tender, symmetric, trace edema    Group & Rh   Date Value Ref Range Status   2025 A POS  Final     Recent Results (from the past 2 weeks)   CBC with Differential    Collection Time: 25  4:37 AM   Result Value Ref Range    WBC 13.38 (H) 4.50 - 11.50 x10(3)/mcL    Hgb 9.6 (L) 12.0 - 16.0 g/dL    Hct 30.1 (L) 37.0 - 47.0 %    Platelet 246 130 - 400 x10(3)/mcL   CBC with Differential    Collection Time: 25  1:08 PM   Result Value Ref Range    WBC 14.71 (H) 4.50 - 11.50 x10(3)/mcL    Hgb 11.7 (L) 12.0 - 16.0 g/dL    Hct 36.4 (L) 37.0 - 47.0 %    Platelet 323 130 - 400 x10(3)/mcL          Assessment:     25 y.o.  S/P  Delivery Post-Operative Day #1  - Doing Well      Plan:     1. Continue routine postpartum care  2. Plan for D/C in 1-2 days

## 2025-07-27 PROCEDURE — 25000003 PHARM REV CODE 250: Performed by: OBSTETRICS & GYNECOLOGY

## 2025-07-27 PROCEDURE — 11000001 HC ACUTE MED/SURG PRIVATE ROOM

## 2025-07-27 RX ADMIN — IBUPROFEN 800 MG: 800 TABLET, FILM COATED ORAL at 10:07

## 2025-07-27 RX ADMIN — MAGNESIUM HYDROXIDE 2400 MG: 400 SUSPENSION ORAL at 10:07

## 2025-07-27 RX ADMIN — IBUPROFEN 800 MG: 800 TABLET, FILM COATED ORAL at 01:07

## 2025-07-27 RX ADMIN — DOCUSATE SODIUM 200 MG: 100 CAPSULE, LIQUID FILLED ORAL at 08:07

## 2025-07-27 RX ADMIN — IBUPROFEN 800 MG: 800 TABLET, FILM COATED ORAL at 05:07

## 2025-07-27 RX ADMIN — DOCUSATE SODIUM 200 MG: 100 CAPSULE, LIQUID FILLED ORAL at 10:07

## 2025-07-27 NOTE — ASSESSMENT & PLAN NOTE
- Patient doing well. Continue routine management and advances.  - Continue PO pain meds. Pain well controlled.  - Pre H/H 11.7/36.4 --> post H/H 9.6/30.1; VSS, asymptomatic  - Encourage ambulation  - Circumcision - N/A  - Contraception - Discussed with primary OB  - Lactation - Breast feeding

## 2025-07-27 NOTE — PLAN OF CARE
Problem: Breastfeeding  Goal: Effective Breastfeeding  Intervention: Promote Effective Breastfeeding  Flowsheets (Taken 7/27/2025 0005)  Breastfeeding Assistance: support offered  Parent-Child Attachment Promotion:   strengths emphasized   positive reinforcement provided  Intervention: Support Exclusive Breastfeeding Success  Flowsheets (Taken 7/27/2025 0005)  Supportive Measures: verbalization of feelings encouraged  Breastfeeding Support: maternal rest encouraged

## 2025-07-27 NOTE — HPI
Beth Mccoy is a 25 y.o.  female with IUP at 36w3d weeks gestation who presents to L&D for pCS for ICP and breech presentation. Pertinent medical history for this pregnancy includes ICP, breech presentation, obesity, oligohydramnios (resolved), FGR.  Care this pregnancy has been with Dr. Rodriguez.

## 2025-07-27 NOTE — PLAN OF CARE
Problem: Adult Inpatient Plan of Care  Goal: Plan of Care Review  Outcome: Progressing  Goal: Patient-Specific Goal (Individualized)  Outcome: Progressing  Goal: Absence of Hospital-Acquired Illness or Injury  Outcome: Progressing  Goal: Optimal Comfort and Wellbeing  Outcome: Progressing  Goal: Readiness for Transition of Care  Outcome: Progressing     Problem:  Delivery  Goal: Bleeding is Controlled  Outcome: Progressing  Goal: Stable Fetal Wellbeing  Outcome: Progressing  Goal: Absence of Infection Signs and Symptoms  Outcome: Progressing  Goal: Effective Oxygenation and Ventilation  Outcome: Progressing     Problem: Infection  Goal: Absence of Infection Signs and Symptoms  Outcome: Progressing     Problem:  Fall Injury Risk  Goal: Absence of Fall, Infant Drop and Related Injury  Outcome: Progressing     Problem: Anesthesia/Analgesia, Neuraxial  Goal: Safe, Effective Infusion Delivery  Outcome: Progressing  Goal: Stable Patient-Fetal Status  Outcome: Progressing  Goal: Absence of Infection Signs and Symptoms  Outcome: Progressing  Goal: Nausea and Vomiting Relief  Outcome: Progressing  Goal: Effective Pain Control  Outcome: Progressing  Goal: Effective Oxygenation and Ventilation  Outcome: Progressing  Goal: Baseline Motor Function Return  Outcome: Progressing  Goal: Effective Urinary Elimination  Outcome: Progressing     Problem: Wound  Goal: Optimal Coping  Outcome: Progressing  Goal: Optimal Functional Ability  Outcome: Progressing  Goal: Absence of Infection Signs and Symptoms  Outcome: Progressing  Goal: Improved Oral Intake  Outcome: Progressing  Goal: Optimal Pain Control and Function  Outcome: Progressing  Goal: Skin Health and Integrity  Outcome: Progressing  Goal: Optimal Wound Healing  Outcome: Progressing

## 2025-07-27 NOTE — SUBJECTIVE & OBJECTIVE
Interval History:   She is doing well this morning. She is tolerating a regular diet without nausea or vomiting. She is voiding spontaneously. She is ambulating. She has passed flatus, and has not a BM. Vaginal bleeding is mild. She denies fever or chills. Abdominal pain is moderate and controlled with oral medications. She Is breastfeeding. She desires circumcision for her male baby: not applicable.    Objective:     Vital Signs (Most Recent):  Temp: 98.9 °F (37.2 °C) (07/27/25 0725)  Pulse: 79 (07/27/25 0725)  Resp: 17 (07/26/25 0029)  BP: 116/73 (07/27/25 0725)  SpO2: 98 % (07/27/25 0725) Vital Signs (24h Range):  Temp:  [98 °F (36.7 °C)-98.9 °F (37.2 °C)] 98.9 °F (37.2 °C)  Pulse:  [79-94] 79  SpO2:  [98 %] 98 %  BP: (116-130)/(73-83) 116/73     Weight: 111.6 kg (246 lb)  Body mass index is 38.53 kg/m².    No intake or output data in the 24 hours ending 07/27/25 1003      Significant Labs:  Lab Results   Component Value Date    GROUPTRH A POS 07/23/2025    HEPBSAG Nonreactive 06/10/2025     Recent Labs   Lab 07/26/25  0437   HGB 9.6*   HCT 30.1*       Lab Results   Component Value Date    WBC 13.38 (H) 07/26/2025    HGB 9.6 (L) 07/26/2025    HCT 30.1 (L) 07/26/2025    MCV 90.9 07/26/2025     07/26/2025           Physical Exam:   Constitutional: She is oriented to person, place, and time. She appears well-developed and well-nourished. No distress.       Cardiovascular:  Normal rate.             Pulmonary/Chest: Effort normal.        Abdominal: Soft. She exhibits abdominal incision. She exhibits no distension.                 Neurological: She is alert and oriented to person, place, and time.    Skin: Skin is warm and dry.    Psychiatric: She has a normal mood and affect.       Review of Systems   Constitutional:  Negative for chills and fever.   Eyes:  Negative for visual disturbance.   Respiratory:  Negative for cough and wheezing.    Cardiovascular:  Negative for chest pain and palpitations.    Gastrointestinal:  Positive for abdominal pain. Negative for nausea and vomiting.   Genitourinary:  Positive for pelvic pain. Negative for dysuria, frequency, hematuria, vaginal bleeding, vaginal discharge and vaginal pain.   Neurological:  Negative for headaches.   Psychiatric/Behavioral:  Negative for depression.

## 2025-07-27 NOTE — PROGRESS NOTES
[facility] - 2nd Floor Mother/Baby Unit  Obstetrics  Postpartum Progress Note    Patient Name: Beth Mccoy  MRN: [id number]  Admission Date: 2025  Hospital Length of Stay: 2 days  Attending Physician: Jevon Rodriguez MD  Primary Care Provider: Laura Law MD    Subjective:     Principal Problem: delivery delivered    Hospital Course:  2025: POD#2 s/p 1LTCS breech presentation/FGR/ICP    Interval History:   She is doing well this morning. She is tolerating a regular diet without nausea or vomiting. She is voiding spontaneously. She is ambulating. She has passed flatus, and has not a BM. Vaginal bleeding is mild. She denies fever or chills. Abdominal pain is moderate and controlled with oral medications. She Is breastfeeding. She desires circumcision for her male baby: not applicable.    Objective:     Vital Signs (Most Recent):  Temp: 98.9 °F (37.2 °C) (25)  Pulse: 79 (25)  Resp: 17 (25 0029)  BP: 116/73 (25)  SpO2: 98 % (25) Vital Signs (24h Range):  Temp:  [98 °F (36.7 °C)-98.9 °F (37.2 °C)] 98.9 °F (37.2 °C)  Pulse:  [79-94] 79  SpO2:  [98 %] 98 %  BP: (116-130)/(73-83) 116/73     Weight: 111.6 kg (246 lb)  Body mass index is 38.53 kg/m².    No intake or output data in the 24 hours ending 25 1003      Significant Labs:  Lab Results   Component Value Date    GROUPTRH A POS 2025    HEPBSAG Nonreactive 06/10/2025     Recent Labs   Lab 25  0437   HGB 9.6*   HCT 30.1*       Lab Results   Component Value Date    WBC 13.38 (H) 2025    HGB 9.6 (L) 2025    HCT 30.1 (L) 2025    MCV 90.9 2025     2025           Physical Exam:   Constitutional: She is oriented to person, place, and time. She appears well-developed and well-nourished. No distress.       Cardiovascular:  Normal rate.             Pulmonary/Chest: Effort normal.        Abdominal: Soft. She exhibits abdominal  incision. She exhibits no distension.                 Neurological: She is alert and oriented to person, place, and time.    Skin: Skin is warm and dry.    Psychiatric: She has a normal mood and affect.       Review of Systems   Constitutional:  Negative for chills and fever.   Eyes:  Negative for visual disturbance.   Respiratory:  Negative for cough and wheezing.    Cardiovascular:  Negative for chest pain and palpitations.   Gastrointestinal:  Positive for abdominal pain. Negative for nausea and vomiting.   Genitourinary:  Positive for pelvic pain. Negative for dysuria, frequency, hematuria, vaginal bleeding, vaginal discharge and vaginal pain.   Neurological:  Negative for headaches.   Psychiatric/Behavioral:  Negative for depression.      Assessment/Plan:     25 y.o. female  for:    *  delivery delivered  - Patient doing well. Continue routine management and advances.  - Continue PO pain meds. Pain well controlled.  - Pre H/H 11.7/36.4 --> post H/H 9.6/30.1; VSS, asymptomatic  - Encourage ambulation  - Circumcision - N/A  - Contraception - Discussed with primary OB  - Lactation - Breast feeding         Disposition: As patient meets milestones, will plan to discharge POD#2-4.    Martha Mock MD  Obstetrics  [facility] - 2nd Floor Mother/Baby Unit

## 2025-07-28 VITALS
RESPIRATION RATE: 20 BRPM | WEIGHT: 246 LBS | OXYGEN SATURATION: 97 % | SYSTOLIC BLOOD PRESSURE: 116 MMHG | HEART RATE: 94 BPM | BODY MASS INDEX: 38.61 KG/M2 | TEMPERATURE: 99 F | HEIGHT: 67 IN | DIASTOLIC BLOOD PRESSURE: 77 MMHG

## 2025-07-28 LAB — PSYCHE PATHOLOGY RESULT: NORMAL

## 2025-07-28 PROCEDURE — 25000003 PHARM REV CODE 250: Performed by: OBSTETRICS & GYNECOLOGY

## 2025-07-28 RX ADMIN — PRENATAL VITAMINS-IRON FUMARATE 27 MG IRON-FOLIC ACID 0.8 MG TABLET 1 TABLET: at 10:07

## 2025-07-28 RX ADMIN — DOCUSATE SODIUM 200 MG: 100 CAPSULE, LIQUID FILLED ORAL at 10:07

## 2025-07-28 RX ADMIN — IBUPROFEN 800 MG: 800 TABLET, FILM COATED ORAL at 10:07

## 2025-07-28 RX ADMIN — IBUPROFEN 800 MG: 800 TABLET, FILM COATED ORAL at 01:07

## 2025-07-28 RX ADMIN — OXYCODONE AND ACETAMINOPHEN 1 TABLET: 325; 10 TABLET ORAL at 11:07

## 2025-07-28 NOTE — DISCHARGE SUMMARY
"Delivery Discharge Summary  Obstetrics      Primary OB Clinician: Honey Wilson MD    Discharge Provider: Alina Mendieta MD    Admission date: 2025  Discharge date: 2025    Admit Dx:   Discharge Dx:  Problem List[1]    Procedure: , due to ICP and Breech presentation    Hospital Course:  Beth Mccoy is a 25 y.o. now  who was admitted on 2025 for delivery. Patient delivered a viable . Please see delivery note for further details. Pt was in stable condition post delivery and was transferred to the Mother-Baby Unit. Her postpartum course was uncomplicated. On the date of discharge, patient's pain is controlled with oral pain medications. She is tolerating ambulation without SOB or CP, and PO diet without N/V. Reported lochia is within the normal range. Pt in stable condition and ready for discharge.     Pertinent studies:  Postpartum CBC  Lab Results   Component Value Date    WBC 13.38 (H) 2025    HGB 9.6 (L) 2025    HCT 30.1 (L) 2025    MCV 90.9 2025     2025       Delivery:    Episiotomy:     Lacerations:     Repair suture:     Repair # of packets:     Blood loss (ml):       Birth information:  YOB: 2024   Time of birth: 9:48 AM   Sex: female   Delivery type: , Low Transverse   Gestational Age: 36w3d     Measurements    Weight: 2520 g  Weight (lbs): 5 lb 8.9 oz  Length: 43.2 cm  Length (in): 17"  Head circumference: 34.3 cm         Delivery Clinician: Delivery Providers    Delivering clinician: Jevon Rodriguez MD   Provider Role    Joni Martinez MD Assisting Surgeon    Ju Gannon,  Surgical Tech    Vivian Monae RN Circulator    Avina, Adelaida, RN Registered Nurse    Sudheer Starr CRNA Nurse Anesthetist    Kirill Baires MD Anesthesiologist    Malika Garcia RN NICU    Erika Deleon, RRT Respiratory Therapist    Radha Dorman LPN Licensed Practical Nurse             Additional  " information:  Forceps:    Vacuum:    Breech:    Observed anomalies      Living?:     Apgars    Living status: Living  Apgar Component Scores:  1 min.:  5 min.:  10 min.:  15 min.:  20 min.:    Skin color:  0  1       Heart rate:  2  2       Reflex irritability:  1  2       Muscle tone:  1  2       Respiratory effort:  1  2       Total:  5  9       Apgars assigned by: CHIKIS PERKINS RN         Placenta: Delivered:       appearance    Disposition: To home, self care    Follow Up: 2 weeks    Patient Instructions:   1. Call the office for any bleeding >2 pads/hour for >2 hours, temperature >100.4, pain that is uncontrolled with medications, or for any other concerns.  2. Pelvic rest and no tub baths x 6 weeks.  3. No driving while on narcotics.    Current Discharge Medication List        CONTINUE these medications which have NOT CHANGED    Details   cholestyramine (QUESTRAN) 4 gram packet Take 1 packet (4 g total) by mouth 2 (two) times daily.  Qty: 180 packet, Refills: 3    Associated Diagnoses: Cholestasis during pregnancy in third trimester      PNV-DHA 27 mg iron-1 mg -300 mg Cap Take 1 capsule by mouth.      triamcinolone acetonide 0.1% (KENALOG) 0.1 % cream Apply topically 2 (two) times daily.  Qty: 45 g, Refills: 6      ursodioL (ACTIGALL) 250 mg Tab Take 2 tablets (500 mg total) by mouth 3 (three) times daily with meals.  Qty: 180 tablet, Refills: 3             Alina Mendieta        [1]   Patient Active Problem List  Diagnosis    Obesity    Eczema    Oligohydramnios antepartum    Cholestasis during pregnancy in third trimester    Poor fetal growth affecting management of mother in third trimester     delivery delivered

## 2025-07-28 NOTE — PLAN OF CARE
Problem: Adult Inpatient Plan of Care  Goal: Plan of Care Review  Outcome: Progressing  Goal: Patient-Specific Goal (Individualized)  Outcome: Progressing  Flowsheets (Taken 2025)  Patient/Family-Specific Goals (Include Timeframe): i want to breastfeed my baby  Goal: Absence of Hospital-Acquired Illness or Injury  Outcome: Progressing  Goal: Optimal Comfort and Wellbeing  Outcome: Progressing  Goal: Readiness for Transition of Care  Outcome: Progressing     Problem:  Delivery  Goal: Bleeding is Controlled  Outcome: Progressing  Goal: Stable Fetal Wellbeing  Outcome: Progressing  Goal: Absence of Infection Signs and Symptoms  Outcome: Progressing  Goal: Effective Oxygenation and Ventilation  Outcome: Progressing     Problem: Infection  Goal: Absence of Infection Signs and Symptoms  Outcome: Progressing     Problem:  Fall Injury Risk  Goal: Absence of Fall, Infant Drop and Related Injury  Outcome: Progressing     Problem: Anesthesia/Analgesia, Neuraxial  Goal: Safe, Effective Infusion Delivery  Outcome: Progressing  Goal: Stable Patient-Fetal Status  Outcome: Progressing  Goal: Absence of Infection Signs and Symptoms  Outcome: Progressing  Goal: Nausea and Vomiting Relief  Outcome: Progressing  Goal: Effective Pain Control  Outcome: Progressing  Goal: Effective Oxygenation and Ventilation  Outcome: Progressing  Goal: Baseline Motor Function Return  Outcome: Progressing  Goal: Effective Urinary Elimination  Outcome: Progressing     Problem: Wound  Goal: Optimal Coping  Outcome: Progressing  Goal: Optimal Functional Ability  Outcome: Progressing  Goal: Absence of Infection Signs and Symptoms  Outcome: Progressing  Goal: Improved Oral Intake  Outcome: Progressing  Goal: Optimal Pain Control and Function  Outcome: Progressing  Goal: Skin Health and Integrity  Outcome: Progressing  Goal: Optimal Wound Healing  Outcome: Progressing     Problem: Breastfeeding  Goal: Effective  Breastfeeding  Outcome: Progressing

## 2025-08-01 NOTE — ANESTHESIA POSTPROCEDURE EVALUATION
Anesthesia Post Evaluation    Patient: Beth Mccoy    Procedure(s) Performed: Procedure(s) (LRB):   SECTION (N/A)    Final Anesthesia Type: spinal      Patient location during evaluation: med/surg floor  Patient participation: Yes- Able to Participate  Level of consciousness: awake and alert  Post-procedure vital signs: reviewed and stable  Pain management: adequate  Airway patency: patent      Anesthetic complications: no      Cardiovascular status: hemodynamically stable  Respiratory status: unassisted  Hydration status: euvolemic  Follow-up not needed.              Vitals Value Taken Time   /77 25 07:07   Temp 37.1 °C (98.7 °F) 25 07:07   Pulse 94 25 07:07   Resp 20 25 11:14   SpO2 97 % 25 07:07         Event Time   Out of Recovery 2025 11:29:00         Pain/Jeevan Score: No data recorded

## 2025-08-15 ENCOUNTER — PATIENT MESSAGE (OUTPATIENT)
Dept: MATERNAL FETAL MEDICINE | Facility: CLINIC | Age: 25
End: 2025-08-15
Payer: COMMERCIAL

## (undated) DEVICE — Device

## (undated) DEVICE — SET RANGER FLD WRM STD FLO